# Patient Record
Sex: FEMALE | Employment: FULL TIME | URBAN - METROPOLITAN AREA
[De-identification: names, ages, dates, MRNs, and addresses within clinical notes are randomized per-mention and may not be internally consistent; named-entity substitution may affect disease eponyms.]

---

## 2017-04-18 RX ORDER — TOPIRAMATE 25 MG/1
TABLET ORAL
Qty: 180 TAB | Refills: 0 | OUTPATIENT
Start: 2017-04-18

## 2017-04-18 NOTE — TELEPHONE ENCOUNTER
Called and inquired about patients prescription and she states we had been given her the medication and she just ran out today taking Topamax 25 mg 3 this morning. Patient states there has been times when she did not take them in the morning because she forget and it have been a lot of those days. Patient states she has had refills on other bottles that she had and she used them and the bottle she has now she used the last refill. Patient was last seen 7/26/16 and was given Topamax 25 mg #180 take 3 po bid with 2 refills. I offered patient a few appointment slots for tomorrow since we had cancellations for time 830, 925, 155 and 350. Patient states she will call the office back to verify if she could do the 925 am tomorrow 4/19/17. Please make appointment if she calls back. Thank you.

## 2017-04-18 NOTE — TELEPHONE ENCOUNTER
The patient requests a courtesy fill to get her though the upcoming appointment. Please advise. Last Visit: 07/26/2016 with MD Jeni Tucker    Next Appointment: 05/10/2017 with MD Jeni Tucker;  Pt canceled 09/27/2016  Previous Refill Encounters: 06/21/2016 per MD Jeni Tucker #[id number] with 1 refill     Requested Prescriptions     Pending Prescriptions Disp Refills    topiramate (TOPAMAX) 25 mg tablet 180 Tab 0     Sig: 3 tabs PO BID

## 2017-04-19 ENCOUNTER — OFFICE VISIT (OUTPATIENT)
Dept: ORTHOPEDIC SURGERY | Age: 32
End: 2017-04-19

## 2017-04-19 VITALS
HEART RATE: 96 BPM | SYSTOLIC BLOOD PRESSURE: 138 MMHG | BODY MASS INDEX: 33.49 KG/M2 | DIASTOLIC BLOOD PRESSURE: 88 MMHG | HEIGHT: 68 IN | WEIGHT: 221 LBS

## 2017-04-19 DIAGNOSIS — M51.36 OTHER INTERVERTEBRAL DISC DEGENERATION, LUMBAR REGION: ICD-10-CM

## 2017-04-19 DIAGNOSIS — M54.16 RADICULOPATHY, LUMBAR REGION: Primary | ICD-10-CM

## 2017-04-19 DIAGNOSIS — M51.26 OTHER INTERVERTEBRAL DISC DISPLACEMENT, LUMBAR REGION: ICD-10-CM

## 2017-04-19 RX ORDER — TOPIRAMATE 25 MG/1
TABLET ORAL
Qty: 180 TAB | Refills: 1 | Status: SHIPPED | OUTPATIENT
Start: 2017-04-19 | End: 2017-08-04 | Stop reason: SDUPTHER

## 2017-04-19 NOTE — PROGRESS NOTES
[facility]  [address]  Phone: [phone]  Fax: [phone]        PROGRESS NOTE      HISTORY OF PRESENT ILLNESS:  The patient is a 32 y.o. female and was seen today for follow up of low back and right hip. She states pain does not extend to RLE posteriorly to ankle as frequently and has decreased in intensity unless she is sitting down for prolonged periods. Pt notes increased activity exacerbates her pain. She had complaints of numbness in the bottom of the foot into digits 3-5. She states twisting exacerbates pain. She tolerates Topamax 25 mg 3 po qhs with significant relief. She is a non-smoker. Note from [place], [place] 4/22/16 indicates patient was seen for acute onset of right-sided low back pain that started in the beginning of April. According to the note, over the next week pain extended into the right lower extremity and she was treated with Flexeril, oral steroids as well as NSAIDs. She states Flexeril has provided a little relief. Per note, the patient had similar episode 10 years ago and had relief with injections by Dr. Daly Hale. Pt completed physical therapy with minimal benefit. Lumbar spine XR from 4/22/16 was reviewed. Per report, there were degenerative changes and scoliosis. No acute abnormality. Mild narrowing at L4-L5. She denies previous lumbar surgery. She has not attended physical therapy/chiropractor recently. Patient denies change in bowel or bladder habits. She denies possibility of pregnancy. Lumbar spine MRI report and films dated 06/06/16 were reviewed. Per report, there was a right paracentral and inferior disc protrusion at L5-S1 which does appear to likely to affect the right S1 root. Clinical correlation is recommended as to whether or not the patient's symptoms include a right S1 radiculopathy. New broad left paracentral small disc protrusion at L2-L3.  This does not affect the left L2 or L3 root or cause significant central stenosis. Degenerative changes at L4-L5 with bulging disc. Bilateral lateral recess stenosis could possibly but does not definitively affect the proximal L5 roots. At her last clinical appointment, patient wishes to continue her current treatment as her pain was tolerable. Patient was not interested in lumbar blocks at that time. She wished to continue with Topamax 75 mg BID and I advised she continue medication with consistency. I refilled her medication. Patient should continue with remaining sessions of physical therapy. I last saw patient on 7/26/16 and was scheduled to f/u in 2 month's time but failed to do so until today. The patient returns today with pain location and distribution remain unchanged. Pt also c/o left foot paraesthesias which could be due to Topamax. She rates pain 2-6/10, an increase since her last visit (3/10). Her pain is exacerbated with activity. Pt continues with Topamax 75 mg BID which offers good relief. She frequently forgets to take her AM dose of Topamax. Pt admits inconsistency with her HEP. She denies h/o DM.  reviewed. Past Medical History:   Diagnosis Date    Hypertension         Social History     Social History    Marital status:      Spouse name: N/A    Number of children: N/A    Years of education: N/A     Occupational History    Not on file. Social History Main Topics    Smoking status: Never Smoker    Smokeless tobacco: Never Used    Alcohol use No    Drug use: No    Sexual activity: Yes     Partners: Male     Other Topics Concern    Not on file     Social History Narrative       Current Outpatient Prescriptions   Medication Sig Dispense Refill    topiramate (TOPAMAX) 25 mg tablet 3 tabs PO  Tab 1    topiramate (TOPAMAX) 25 mg tablet 3 tabs PO  Tab 1    ibuprofen (MOTRIN IB) 200 mg tablet Take 400-600 mg by mouth every six (6) hours as needed.       guaiFENesin ER (MUCINEX) 600 mg ER tablet Take 1 Tab by mouth two (2) times a day. Indications: COLD SYMPTOMS, COUGH 28 Tab 0       No Known Allergies       PHYSICAL EXAMINATION    Visit Vitals    /88    Pulse 96    Ht 5' 8\" (1.727 m)    Wt 221 lb (100.2 kg)    BMI 33.6 kg/m2       CONSTITUTIONAL: NAD, A&O x 3  SENSATION: Intact to light touch throughout  RANGE OF MOTION: The patient has full passive range of motion in all four extremities. MOTOR:  Straight Leg Raise: Negative, bilateral               Hip Flex Knee Ext Knee Flex Ankle DF GTE Ankle PF Tone   Right +4/5 +4/5 +4/5 +4/5 +4/5 +4/5 +4/5   Left +4/5 +4/5 +4/5 +4/5 +4/5 +4/5 +4/5       ASSESSMENT   Braxton Peck was seen today for follow-up. Diagnoses and all orders for this visit:    Radiculopathy, lumbar region    Other intervertebral disc degeneration, lumbar region    Other intervertebral disc displacement, lumbar region    Other orders  -     topiramate (TOPAMAX) 25 mg tablet; 3 tabs PO BID          IMPRESSION AND PLAN:  Patient reveals she is only taking Topamax 75 mg qhs and rarely her AM dose. I explained to patient how Topamax works. She does have intermittent flare ups and would like to start being consistent with her AM dose of Topamax. I will refill her Topamax 75 mg BID. I advise she closely monitor if her left foot paraesthesias increases as she begins to be consistent with her AM dose of Topamax. I recommend she increase the frequency of HEP to daily. I will see the patient back in 1 month's time or earlier if needed. Written by Srinivasan Yuong, as dictated by Stephanie Santiago MD  I examined the patient, reviewed and agree with the note.

## 2017-04-19 NOTE — MR AVS SNAPSHOT
Visit Information Date & Time Provider Department Dept. Phone Encounter #  
 4/19/2017  9:25 AM Jigna Dorsey MD [facility] [place] [phone] [id number] Follow-up Instructions Return in about 4 weeks (around 5/17/2017). Upcoming Health Maintenance Date Due DTaP/Tdap/Td series (1 - Tdap) 8/5/2006 INFLUENZA AGE 9 TO ADULT 8/1/2016 PAP AKA CERVICAL CYTOLOGY 5/23/2017 Allergies as of 4/19/2017  Review Complete On: 4/19/2017 By: Jigna Dorsey MD  
 No Known Allergies Current Immunizations  Reviewed on 11/16/2015 Name Date Influenza Vaccine (Quad) PF 11/16/2015 11:47 AM  
  
 Not reviewed this visit You Were Diagnosed With   
  
 Codes Comments Radiculopathy, lumbar region    -  Primary ICD-10-CM: M54.16 
ICD-9-CM: 724.4 Other intervertebral disc degeneration, lumbar region     ICD-10-CM: M51.36 
ICD-9-CM: 722.52 Other intervertebral disc displacement, lumbar region     ICD-10-CM: M51.26 
ICD-9-CM: 722.10 Vitals BP Pulse Height(growth percentile) Weight(growth percentile) BMI OB Status 138/88 96 5' 8\" (1.727 m) 221 lb (100.2 kg) 33.6 kg/m2 Having regular periods Smoking Status Never Smoker Vitals History BMI and BSA Data Body Mass Index Body Surface Area  
 33.6 kg/m 2 2.19 m 2 Preferred Pharmacy Pharmacy Name Phone [facility] [address] [facility], [address] [place] [address] [phone] Your Updated Medication List  
  
   
This list is accurate as of: 4/19/17 10:13 AM.  Always use your most recent med list.  
  
  
  
  
 guaiFENesin  mg ER tablet Commonly known as:  [place] 598 Take 1 Tab by mouth two (2) times a day. Indications: COLD SYMPTOMS, COUGH MOTRIN  mg tablet Generic drug:  ibuprofen Take 400-600 mg by mouth every six (6) hours as needed. * topiramate 25 mg tablet Commonly known as:  TOPAMAX 3 tabs PO BID * topiramate 25 mg tablet Commonly known as:  TOPAMAX 3 tabs PO BID * Notice: This list has 2 medication(s) that are the same as other medications prescribed for you. Read the directions carefully, and ask your doctor or other care provider to review them with you. Prescriptions Sent to Pharmacy Refills  
 topiramate (TOPAMAX) 25 mg tablet 1 Sig: 3 tabs PO BID Class: Normal  
 Pharmacy: [facility] [address], [address] [place] [address] #: [phone] Follow-up Instructions Return in about 4 weeks (around 5/17/2017). Introducing [facility] & HEALTH SERVICES! K[employer]lin R[employer] introduces 2NGageU patient portal. Now you can access parts of your medical record, email your doctor's office, and request medication refills online. 1. In your internet browser, go to [email or web address] 2. Click on the First Time User? Click Here link in the Sign In box. You will see the New Member Sign Up page. 3. Enter your 2NGageU Access Code exactly as it appears below. You will not need to use this code after youve completed the sign-up process. If you do not sign up before the expiration date, you must request a new code. · 2NGage[employer] Access Code: [email or web address] Expires: 7/17/2017  3:22 PM 
 
4. Enter the last four digits of your Social Security Number (xxxx) and Date of Birth (mm/dd/yyyy) as indicated and click Submit. You will be taken to the next sign-up page. 5. Create a 2NGageU ID. This will be your 2NGageU login ID and cannot be changed, so think of one that is secure and easy to remember. 6. Create a 2NGageU password. You can change your password at any time. 7. Enter your Password Reset Question and Answer. This can be used at a later time if you forget your password. 8. Enter your e-mail address. You will receive e-mail notification when new information is available in [address] 9. Click Sign Up. You can now view and download portions of your medical record. 10. Click the Download Summary menu link to download a portable copy of your medical information. If you have questions, please visit the Frequently Asked Questions section of the [employer] website. Remember, [employer] is NOT to be used for urgent needs. For medical emergencies, dial 911. Now available from your iPhone and Android! Please provide this summary of care documentation to your next provider. Your primary care clinician is listed as Erin Kahn. Russ Bernal. If you have any questions after today's visit, please call [phone].

## 2017-05-22 ENCOUNTER — OFFICE VISIT (OUTPATIENT)
Dept: ORTHOPEDIC SURGERY | Age: 32
End: 2017-05-22

## 2017-05-22 VITALS
BODY MASS INDEX: 33.65 KG/M2 | HEIGHT: 68 IN | SYSTOLIC BLOOD PRESSURE: 132 MMHG | DIASTOLIC BLOOD PRESSURE: 85 MMHG | HEART RATE: 85 BPM | WEIGHT: 222 LBS | RESPIRATION RATE: 16 BRPM

## 2017-05-22 DIAGNOSIS — M51.26 OTHER INTERVERTEBRAL DISC DISPLACEMENT, LUMBAR REGION: ICD-10-CM

## 2017-05-22 DIAGNOSIS — M51.36 OTHER INTERVERTEBRAL DISC DEGENERATION, LUMBAR REGION: ICD-10-CM

## 2017-05-22 DIAGNOSIS — M54.16 RADICULOPATHY, LUMBAR REGION: Primary | ICD-10-CM

## 2017-05-22 RX ORDER — TOPIRAMATE 25 MG/1
TABLET ORAL
Qty: 180 TAB | Refills: 1 | Status: SHIPPED | OUTPATIENT
Start: 2017-05-22 | End: 2017-09-27 | Stop reason: SDUPTHER

## 2017-05-22 NOTE — MR AVS SNAPSHOT
Visit Information Date & Time Provider Department Dept. Phone Encounter #  
 5/22/2017 10:15 AM Jigna Dorsey MD [address] [facility] [address] [facility] [place] [phone] [id number] Follow-up Instructions Return in about 3 months (around 8/22/2017). Upcoming Health Maintenance Date Due DTaP/Tdap/Td series (1 - Tdap) 8/5/2006 PAP AKA CERVICAL CYTOLOGY 5/23/2017 INFLUENZA AGE 9 TO ADULT 8/1/2017 Allergies as of 5/22/2017  Review Complete On: 5/22/2017 By: Jigna Dorsey MD  
 No Known Allergies Current Immunizations  Reviewed on 11/16/2015 Name Date Influenza Vaccine (Quad) PF 11/16/2015 11:47 AM  
  
 Not reviewed this visit You Were Diagnosed With   
  
 Codes Comments Radiculopathy, lumbar region    -  Primary ICD-10-CM: M54.16 
ICD-9-CM: 724.4 Other intervertebral disc degeneration, lumbar region     ICD-10-CM: M51.36 
ICD-9-CM: 722.52 Other intervertebral disc displacement, lumbar region     ICD-10-CM: M51.26 
ICD-9-CM: 722.10 Vitals BP Pulse Resp Height(growth percentile) Weight(growth percentile) BMI  
 132/85 (BP 1 Location: Right arm, BP Patient Position: Sitting) 85 16 5' 8\" (1.727 m) 222 lb (100.7 kg) 33.75 kg/m2 OB Status Smoking Status Having regular periods Never Smoker Vitals History BMI and BSA Data Body Mass Index Body Surface Area 33.75 kg/m 2 2.2 m 2 Preferred Pharmacy Pharmacy Name Phone Sudha Li [address] [facility] [address], [address] [place] [address] Road [phone] Your Updated Medication List  
  
   
This list is accurate as of: 5/22/17 10:24 AM.  Always use your most recent med list.  
  
  
  
  
 MOTRIN  mg tablet Generic drug:  ibuprofen Take 400-600 mg by mouth every six (6) hours as needed. * topiramate 25 mg tablet Commonly known as:  TOPAMAX 3 tabs PO BID * topiramate 25 mg tablet Commonly known as:  TOPAMAX 3 tabs PO BID * topiramate 25 mg tablet Commonly known as:  TOPAMAX 3 tabs PO BID * Notice: This list has 3 medication(s) that are the same as other medications prescribed for you. Read the directions carefully, and ask your doctor or other care provider to review them with you. Prescriptions Sent to Pharmacy Refills  
 topiramate (TOPAMAX) 25 mg tablet 1 Sig: 3 tabs PO BID Class: Normal  
 Pharmacy: [facility] [address], [address] [place] [address] #: [phone] Follow-up Instructions Return in about 3 months (around 8/22/2017). Introducing hospitals & HEALTH SERVICES! [place] [employer] introduces [employer]o patient portal. Now you can access parts of your medical record, email your doctor's office, and request medication refills online. 1. In your internet browser, go to [email or web address] 2. Click on the First Time User? Click Here link in the Sign In box. You will see the New Member Sign Up page. 3. Enter your Fliplingo Access Code exactly as it appears below. You will not need to use this code after youve completed the sign-up process. If you do not sign up before the expiration date, you must request a new code. · Fliplingo Access Code: [email or web address] Expires: 7/17/2017  3:22 PM 
 
4. Enter the last four digits of your Social Security Number (xxxx) and Date of Birth (mm/dd/yyyy) as indicated and click Submit. You will be taken to the next sign-up page. 5. Create a PromoJamt ID. This will be your PromoJamt login ID and cannot be changed, so think of one that is secure and easy to remember. 6. Create a Fliplingo password. You can change your password at any time. 7. Enter your Password Reset Question and Answer. This can be used at a later time if you forget your password. 8. Enter your e-mail address. You will receive e-mail notification when new information is available in [address] 9. Click Sign Up. You can now view and download portions of your medical record. 10. Click the Download Summary menu link to download a portable copy of your medical information. If you have questions, please visit the Frequently Asked Questions section of the [employer] website. Remember, [employer] is NOT to be used for urgent needs. For medical emergencies, dial 911. Now available from your iPhone and Android! Please provide this summary of care documentation to your next provider. Your primary care clinician is listed as Darek [place]. If you have any questions after today's visit, please call [phone].

## 2017-05-22 NOTE — PROGRESS NOTES
[facility]  [address]  Phone: [phone]  Fax: [phone]        PROGRESS NOTE      HISTORY OF PRESENT ILLNESS:  The patient is a 32 y.o. female and was seen today for follow up of low back and right hip. She states pain does not extend to RLE posteriorly to ankle as frequently and has decreased in intensity unless she is sitting down for prolonged periods. Pt notes increased activity or twisting exacerbate her pain. She had complaints of numbness in the bottom of the foot into digits 3-5. Note from [place], [place] 4/22/16 indicates patient was seen for acute onset of right-sided low back pain that started in the beginning of April. According to the note, over the next week pain extended into the right lower extremity and she was treated with Flexeril, oral steroids as well as NSAIDs. She states Flexeril has provided a little relief. Per note, the patient had similar episode 10 years ago and had relief with injections by Dr. Serina Arellano. Pt continues with Topamax 75 mg BID which offers good relief. She frequently forgets to take her AM dose of Topamax. Pt completed physical therapy with minimal benefit. Lumbar spine XR from 4/22/16 was reviewed. Per report, there were degenerative changes and scoliosis. No acute abnormality. Mild narrowing at L4-L5. She denies previous lumbar surgery. She has not attended physical therapy/chiropractor recently. Pt denies change in bowel or bladder habits. She denies possibility of pregnancy. Pt denies h/o DM. Lumbar spine MRI report and films dated 06/06/16 were reviewed. Per report, there was a right paracentral and inferior disc protrusion at L5-S1 which does appear to likely to affect the right S1 root. Clinical correlation is recommended as to whether or not the patient's symptoms include a right S1 radiculopathy. New broad left paracentral small disc protrusion at L2-L3.  This does not affect the left L2 or L3 root or cause significant central stenosis. Degenerative changes at L4-L5 with bulging disc. Bilateral lateral recess stenosis could possibly but does not definitively affect the proximal L5 roots. At her last clinical appointment, patient revealed she was only taking Topamax 75 mg qhs and rarely her AM dose. I explained to patient how Topamax works. She did have intermittent flare ups and wished to start being consistent with her AM dose of Topamax. I refilled her Topamax 75 mg BID. I advised she closely monitor if her left foot paraesthesias increased as she began to be consistent with her AM dose of Topamax. I recommended she increase the frequency of HEP to daily. The patient returns today with pain location and distribution remain unchanged. She rates pain 2/10, an improvement since her last visit (2-6/10). Pt is currently taking Topamax 75 mg BID with benefit. She states she continues to have left foot paraesthesias. Pt admits completing her HEP 5x/week.  reviewed. Past Medical History:   Diagnosis Date    Hypertension         Social History     Social History    Marital status:      Spouse name: N/A    Number of children: N/A    Years of education: N/A     Occupational History    Not on file. Social History Main Topics    Smoking status: Never Smoker    Smokeless tobacco: Never Used    Alcohol use No    Drug use: No    Sexual activity: Yes     Partners: Male     Other Topics Concern    Not on file     Social History Narrative       Current Outpatient Prescriptions   Medication Sig Dispense Refill    topiramate (TOPAMAX) 25 mg tablet 3 tabs PO  Tab 1    topiramate (TOPAMAX) 25 mg tablet 3 tabs PO  Tab 1    topiramate (TOPAMAX) 25 mg tablet 3 tabs PO  Tab 1    ibuprofen (MOTRIN IB) 200 mg tablet Take 400-600 mg by mouth every six (6) hours as needed.          No Known Allergies       PHYSICAL EXAMINATION    Visit Vitals    /85 (BP 1 Location: Right arm, BP Patient Position: Sitting)    Pulse 85    Resp 16    Ht 5' 8\" (1.727 m)    Wt 222 lb (100.7 kg)    BMI 33.75 kg/m2       CONSTITUTIONAL: NAD, A&O x 3  SENSATION: Intact to light touch throughout  RANGE OF MOTION: The patient has full passive range of motion in all four extremities. MOTOR:  Straight Leg Raise: Negative, bilateral               Hip Flex Knee Ext Knee Flex Ankle DF GTE Ankle PF Tone   Right +4/5 +4/5 +4/5 +4/5 +4/5 +4/5 +4/5   Left +4/5 +4/5 +4/5 +4/5 +4/5 +4/5 +4/5       ASSESSMENT   Lalita Carvajal was seen today for back pain and hip pain. Diagnoses and all orders for this visit:    Radiculopathy, lumbar region    Other intervertebral disc degeneration, lumbar region    Other intervertebral disc displacement, lumbar region    Other orders  -     topiramate (TOPAMAX) 25 mg tablet; 3 tabs PO BID          IMPRESSION AND PLAN:  Patient is not particularly interested in surgical intervention or lumbar blocks at this time. She reports her symptoms are well-controlled on Topamax 75 mg BID. Patient wished to continue her current treatment. She was provided with refills of her Topamax 75 mg BID. I recommend she increase the frequency of HEP to daily. I will see the patient back in 3 month's time or earlier if needed. Written by Arthea , as dictated by Waqas Reynoso MD  I examined the patient, reviewed and agree with the note.

## 2017-06-23 ENCOUNTER — TELEPHONE (OUTPATIENT)
Dept: ORTHOPEDIC SURGERY | Age: 32
End: 2017-06-23

## 2017-06-23 DIAGNOSIS — M54.16 RADICULOPATHY, LUMBAR REGION: ICD-10-CM

## 2017-06-23 DIAGNOSIS — M51.26 OTHER INTERVERTEBRAL DISC DISPLACEMENT, LUMBAR REGION: Primary | ICD-10-CM

## 2017-06-23 DIAGNOSIS — M51.36 DDD (DEGENERATIVE DISC DISEASE), LUMBAR: ICD-10-CM

## 2017-06-23 DIAGNOSIS — M54.16 LUMBAR NEURITIS: ICD-10-CM

## 2017-06-23 RX ORDER — METHYLPREDNISOLONE 4 MG/1
TABLET ORAL
Qty: 1 DOSE PACK | Refills: 0 | Status: SHIPPED | OUTPATIENT
Start: 2017-06-23 | End: 2017-08-04 | Stop reason: ALTCHOICE

## 2017-06-23 NOTE — TELEPHONE ENCOUNTER
Called and left voice mail informing patient that the provider will be doing a prescription for Medrol dose pack and take it as directed.

## 2017-06-23 NOTE — TELEPHONE ENCOUNTER
Is she neurologically intact? If so, we could try a MDP (if not diabetic) and hopefully that will calm things down and schedule for an earlier f/u appointment.

## 2017-06-23 NOTE — TELEPHONE ENCOUNTER
Pt called in states that last Tuesday 06/20/17 the pt was lifting her daughter (toddler) off the toilet and she felt a grinding on her pelvic and spine area. Pt states that last time after a few days it got better with ibuprofen but its not working this time. Pt states that she is in pain with a burning sensation on her lower back and sharp pains that go to her hip and pelvic area. Pt is still working but very uncomfortable in every position unless she is laying on her side. Pt would like to know if she should be seen or go to an urgent care.      Please advise pt at [phone]

## 2017-06-27 NOTE — TELEPHONE ENCOUNTER
Called and left a voice mail inquiring about how she was doing since taking the Medrol Dose pack or if she ever picked it up. Patient does not have a follow up at this time.

## 2017-06-29 ENCOUNTER — TELEPHONE (OUTPATIENT)
Dept: ORTHOPEDIC SURGERY | Age: 32
End: 2017-06-29

## 2017-06-29 NOTE — TELEPHONE ENCOUNTER
Patient last seen 5/22/17 and she was provided with refills of her Topamax 25 mg # po bid and scheduled to follow up in 3 months. Patient then called on 6/23/17 due to having some increased back pain after picking her child up and was given a Medrol dose pack. Patient has no follow up. Please advise.

## 2017-06-29 NOTE — TELEPHONE ENCOUNTER
Patient called to report that methylPREDNISolone (MEDROL DOSEPACK) 4 mg tablet is now completed and her pain is worsening and her sciatica is also worse.   Please contact patient to discuss at either [phone] or [phone]

## 2017-06-30 ENCOUNTER — OFFICE VISIT (OUTPATIENT)
Dept: ORTHOPEDIC SURGERY | Age: 32
End: 2017-06-30

## 2017-06-30 VITALS
DIASTOLIC BLOOD PRESSURE: 82 MMHG | WEIGHT: 219 LBS | HEIGHT: 68 IN | SYSTOLIC BLOOD PRESSURE: 134 MMHG | HEART RATE: 82 BPM | BODY MASS INDEX: 33.19 KG/M2

## 2017-06-30 DIAGNOSIS — M51.36 OTHER INTERVERTEBRAL DISC DEGENERATION, LUMBAR REGION: ICD-10-CM

## 2017-06-30 DIAGNOSIS — M54.16 RADICULOPATHY, LUMBAR REGION: Primary | ICD-10-CM

## 2017-06-30 DIAGNOSIS — M51.26 OTHER INTERVERTEBRAL DISC DISPLACEMENT, LUMBAR REGION: ICD-10-CM

## 2017-06-30 RX ORDER — NAPROXEN 500 MG/1
500 TABLET ORAL 2 TIMES DAILY WITH MEALS
Qty: 30 TAB | Refills: 0 | Status: SHIPPED | OUTPATIENT
Start: 2017-06-30 | End: 2018-05-23 | Stop reason: ALTCHOICE

## 2017-06-30 RX ORDER — OXYCODONE AND ACETAMINOPHEN 5; 325 MG/1; MG/1
1 TABLET ORAL 3 TIMES DAILY
Qty: 60 TAB | Refills: 0 | Status: SHIPPED | OUTPATIENT
Start: 2017-06-30 | End: 2017-08-04 | Stop reason: SDUPTHER

## 2017-06-30 RX ORDER — METHYLPREDNISOLONE 4 MG/1
TABLET ORAL
Qty: 1 DOSE PACK | Refills: 0 | Status: SHIPPED | OUTPATIENT
Start: 2017-06-30 | End: 2017-08-04 | Stop reason: ALTCHOICE

## 2017-06-30 NOTE — MR AVS SNAPSHOT
Visit Information Date & Time Provider Department Dept. Phone Encounter #  
 6/30/2017  9:15 AM Vivi Patino MD 4 [facility], [address] [facility] [place] [phone] [id number] Follow-up Instructions Return for following MRI. Upcoming Health Maintenance Date Due DTaP/Tdap/Td series (1 - Tdap) 8/5/2006 PAP AKA CERVICAL CYTOLOGY 5/23/2017 INFLUENZA AGE 9 TO ADULT 8/1/2017 Allergies as of 6/30/2017  Review Complete On: 6/30/2017 By: Vivi Patino MD  
 No Known Allergies Current Immunizations  Reviewed on 11/16/2015 Name Date Influenza Vaccine (Quad) PF 11/16/2015 11:47 AM  
  
 Not reviewed this visit You Were Diagnosed With   
  
 Codes Comments Radiculopathy, lumbar region    -  Primary ICD-10-CM: M54.16 
ICD-9-CM: 724.4 Other intervertebral disc degeneration, lumbar region     ICD-10-CM: M51.36 
ICD-9-CM: 722.52 Other intervertebral disc displacement, lumbar region     ICD-10-CM: M51.26 
ICD-9-CM: 722.10 Vitals BP Pulse Height(growth percentile) Weight(growth percentile) BMI OB Status 134/82 (BP 1 Location: Left arm, BP Patient Position: Sitting) 82 5' 8\" (1.727 m) 219 lb (99.3 kg) 33.3 kg/m2 Having regular periods Smoking Status Never Smoker Vitals History BMI and BSA Data Body Mass Index Body Surface Area  
 33.3 kg/m 2 2.18 m 2 Preferred Pharmacy Pharmacy Name Phone [facility] [address] [phone] Your Updated Medication List  
  
   
This list is accurate as of: 6/30/17 10:05 AM.  Always use your most recent med list.  
  
  
  
  
 * methylPREDNISolone 4 mg tablet Commonly known as:  Evorn Katayama Per dose pack instructions * methylPREDNISolone 4 mg tablet Commonly known as:  Evorn Katayama Per dose pack instructions MOTRIN  mg tablet Generic drug:  ibuprofen Take 400-600 mg by mouth every six (6) hours as needed. naproxen 500 mg tablet Commonly known as:  NAPROSYN Take 1 Tab by mouth two (2) times daily (with meals). oxyCODONE-acetaminophen 5-325 mg per tablet Commonly known as:  PERCOCET Take 1 Tab by mouth three (3) times daily. Max Daily Amount: 3 Tabs. * topiramate 25 mg tablet Commonly known as:  TOPAMAX 3 tabs PO BID * topiramate 25 mg tablet Commonly known as:  TOPAMAX 3 tabs PO BID * topiramate 25 mg tablet Commonly known as:  TOPAMAX 3 tabs PO BID * Notice: This list has 5 medication(s) that are the same as other medications prescribed for you. Read the directions carefully, and ask your doctor or other care provider to review them with you. Prescriptions Printed Refills  
 oxyCODONE-acetaminophen (PERCOCET) 5-325 mg per tablet 0 Sig: Take 1 Tab by mouth three (3) times daily. Max Daily Amount: 3 Tabs. Class: Print Route: Oral  
  
Prescriptions Sent to Pharmacy Refills  
 methylPREDNISolone (MEDROL DOSEPACK) 4 mg tablet 0 Sig: Per dose pack instructions Class: Normal  
 Pharmacy: [facility] Ph #: [phone]  
 naproxen (NAPROSYN) 500 mg tablet 0 Sig: Take 1 Tab by mouth two (2) times daily (with meals). Class: Normal  
 Pharmacy: [facility] [address] Ph #: [phone] Route: Oral  
  
Follow-up Instructions Return for following MRI. To-Do List   
 07/07/2017 Imaging:  MRI LUMB SPINE WO CONT Referral Information Referral ID Referred By Referred To  
  
 [id number] CARLOS ALBERTO PARRA III Not Available Visits Status Start Date End Date 1 New Request 6/30/17 6/30/18 If your referral has a status of pending review or denied, additional information will be sent to support the outcome of this decision. Introducing [facility]! Kaitlin Rdz introduces [facility] patient portal. Now you can access parts of your medical record, email your doctor's office, and request medication refills online. 1. In your internet browser, go to [email or web address] 2. Click on the First Time User? Click Here link in the Sign In box. You will see the New Member Sign Up page. 3. Enter your [facility] Access Code exactly as it appears below. You will not need to use this code after youve completed the sign-up process. If you do not sign up before the expiration date, you must request a new code. · [facility] Access Code: [email or web address] Expires: 7/17/2017  3:22 PM 
 
4. Enter the last four digits of your Social Security Number (xxxx) and Date of Birth (mm/dd/yyyy) as indicated and click Submit. You will be taken to the next sign-up page. 5. Create a [facility] ID. This will be your [facility] login ID and cannot be changed, so think of one that is secure and easy to remember. 6. Create a [facility] password. You can change your password at any time. 7. Enter your Password Reset Question and Answer. This can be used at a later time if you forget your password. 8. Enter your e-mail address. You will receive e-mail notification when new information is available in [address] 9. Click Sign Up. You can now view and download portions of your medical record. 10. Click the Download Summary menu link to download a portable copy of your medical information. If you have questions, please visit the Frequently Asked Questions section of the [facility] website. Remember, [facility] is NOT to be used for urgent needs. For medical emergencies, dial 911. Now available from your iPhone and Android! Please provide this summary of care documentation to your next provider. Your primary care clinician is listed as Leigha Pichardo. Horatio Gitelman. If you have any questions after today's visit, please call [phone].

## 2017-06-30 NOTE — PROGRESS NOTES
[facility]  [address], [place]   Phone: [phone]  Fax: [phone]        PROGRESS NOTE      HISTORY OF PRESENT ILLNESS:  The patient is a 32 y.o. female and was seen today for follow up of low back and right hip. She states pain does not extend to RLE posteriorly to ankle as frequently and has decreased in intensity unless she is sitting down for prolonged periods. Pt notes increased activity or twisting exacerbate her pain. She states she continues to have left foot paraesthesias. Note from [place], [place] 4/22/16 indicates patient was seen for acute onset of right-sided low back pain that started in the beginning of April. According to the note, over the next week pain extended into the right lower extremity and she was treated with Flexeril, oral steroids as well as NSAIDs. She states Flexeril has provided a little relief. Per note, the patient had similar episode 10 years ago and had relief with injections by Dr. Marino Raymundo. Pt continues with Topamax 75 mg BID. She frequently forgets to take her AM dose of Topamax. Pt completed physical therapy with minimal benefit. Pt denies h/o DM, stomach ulcers or bleeding disorders. Lumbar spine XR from 4/22/16 was reviewed. Per report, there were degenerative changes and scoliosis. No acute abnormality. Mild narrowing at L4-L5. She denies previous lumbar surgery. She has not attended physical therapy/chiropractor recently. Pt denies change in bowel or bladder habits. She denies possibility of pregnancy. Pt denies h/o DM. Lumbar spine MRI report and films dated 6/6/16 were reviewed. Per report, there was a right paracentral and inferior disc protrusion at L5-S1 which does appear to likely to affect the right S1 root. Clinical correlation is recommended as to whether or not the patient's symptoms include a right S1 radiculopathy. New broad left paracentral small disc protrusion at L2-L3.  This does not affect the left L2 or L3 root or cause significant central stenosis. Degenerative changes at L4-L5 with bulging disc. Bilateral lateral recess stenosis could possibly but does not definitively affect the proximal L5 roots. At her last clinical appointment, patient was not particularly interested in surgical intervention or lumbar blocks at that time. She reported her symptoms were well-controlled on Topamax 75 mg BID. Patient wished to continue her current treatment. She was provided with refills of her Topamax 75 mg BID. I recommended she increase the frequency of HEP to daily. I last saw patient on 5/22/17 and was to f/u in 3 month's time but is seen today earlier than scheduled secondary to onset of left-sided low back and hip pain extending into the LLE in an L5 distribution to the big toe since 6/20/17, no recent injury. Pt denies right-sided symptoms at this time. She rates pain 10/10, elevated since her last visit (2/10). Pt was given a Medrol Dosepak on 6/23/17 which provided no relief. She continues with Topamax 75 mg BID. Pt admits completing her HEP 5x/week.  reviewed. Past Medical History:   Diagnosis Date    Hypertension         Social History     Social History    Marital status:      Spouse name: N/A    Number of children: N/A    Years of education: N/A     Occupational History    Not on file. Social History Main Topics    Smoking status: Never Smoker    Smokeless tobacco: Never Used    Alcohol use No    Drug use: No    Sexual activity: Yes     Partners: Male     Other Topics Concern    Not on file     Social History Narrative       Current Outpatient Prescriptions   Medication Sig Dispense Refill    methylPREDNISolone (MEDROL DOSEPACK) 4 mg tablet Per dose pack instructions 1 Dose Pack 0    naproxen (NAPROSYN) 500 mg tablet Take 1 Tab by mouth two (2) times daily (with meals).  30 Tab 0    oxyCODONE-acetaminophen (PERCOCET) 5-325 mg per tablet Take 1 Tab by mouth three (3) times daily. Max Daily Amount: 3 Tabs. 60 Tab 0    topiramate (TOPAMAX) 25 mg tablet 3 tabs PO  Tab 1    topiramate (TOPAMAX) 25 mg tablet 3 tabs PO  Tab 1    topiramate (TOPAMAX) 25 mg tablet 3 tabs PO  Tab 1    ibuprofen (MOTRIN IB) 200 mg tablet Take 400-600 mg by mouth every six (6) hours as needed.  methylPREDNISolone (MEDROL DOSEPACK) 4 mg tablet Per dose pack instructions 1 Dose Pack 0       No Known Allergies       PHYSICAL EXAMINATION    Visit Vitals    /82 (BP 1 Location: Left arm, BP Patient Position: Sitting)    Pulse 82    Ht 5' 8\" (1.727 m)    Wt 219 lb (99.3 kg)    BMI 33.3 kg/m2       CONSTITUTIONAL: NAD, A&O x 3  SENSATION: Intact to light touch throughout  RANGE OF MOTION: The patient has full passive range of motion in all four extremities. MOTOR:  Straight Leg Raise: Positive, left               Hip Flex Knee Ext Knee Flex Ankle DF GTE Ankle PF Tone   Right +4/5 +4/5 +4/5 +4/5 +4/5 +4/5 +4/5   Left +4/5 +4/5 +4/5 +4/5 +4/5 +4/5 +4/5       ASSESSMENT   Paula Galarza was seen today for back pain. Diagnoses and all orders for this visit:    Radiculopathy, lumbar region  -     MRI LUMB SPINE WO CONT; Future    Other intervertebral disc degeneration, lumbar region  -     MRI LUMB SPINE WO CONT; Future    Other intervertebral disc displacement, lumbar region  -     MRI LUMB SPINE WO CONT; Future    Other orders  -     methylPREDNISolone (MEDROL DOSEPACK) 4 mg tablet; Per dose pack instructions  -     naproxen (NAPROSYN) 500 mg tablet; Take 1 Tab by mouth two (2) times daily (with meals). -     oxyCODONE-acetaminophen (PERCOCET) 5-325 mg per tablet; Take 1 Tab by mouth three (3) times daily. Max Daily Amount: 3 Tabs. IMPRESSION AND PLAN:  The patient has left-sided symptoms with primarily right-sided pathology. I will set her up for a new lumbar spine MRI. I advised patient to bring copies of films to next visit. I will start her on another Medrol Dosepak.  I gave her a prescription for Naproxen following completion of the Medrol Dosepak. I will give her a Rx for Percocet 5-325 mg TID prn. She should continue on Topamax 75 mg BID. I will see the patient back following MRI. Written by Steve Velásquez, as dictated by Milagros Wilder MD  I examined the patient, reviewed and agree with the note.

## 2017-07-07 ENCOUNTER — DOCUMENTATION ONLY (OUTPATIENT)
Dept: ORTHOPEDIC SURGERY | Age: 32
End: 2017-07-07

## 2017-07-07 NOTE — PROGRESS NOTES
MRI Lumbar without is scheduled at [facility], [address] [facility] [address] on 07/12/17, arrive 9:30AM, test 10:00AM. Pre-authorization is [id number], effective 07/07/17-08/05/17. I have left a message for Ms. Vannesa Camarenaanjelica regarding this appointment. Her follow-up is on 08/04/17 with Dr. Red Dotson.

## 2017-07-10 ENCOUNTER — TELEPHONE (OUTPATIENT)
Dept: ORTHOPEDIC SURGERY | Age: 32
End: 2017-07-10

## 2017-07-10 NOTE — TELEPHONE ENCOUNTER
Patient was unable to completely fill the Percocet prescription. She was informed by her pharmacy her insurance will only approve a 7 day supply so she was given 21 pills. Please provide another prescription. MsAdonay Ernestina Corona will  the prescription at [facility]. Please call patient to advise. Thank you.

## 2017-07-11 RX ORDER — OXYCODONE AND ACETAMINOPHEN 5; 325 MG/1; MG/1
1 TABLET ORAL 3 TIMES DAILY
Qty: 21 TAB | Refills: 0 | Status: SHIPPED | OUTPATIENT
Start: 2017-07-14 | End: 2017-11-14 | Stop reason: SDUPTHER

## 2017-07-11 NOTE — TELEPHONE ENCOUNTER
Please let her know I can write another rx but the insurance may deny that as well. With new guidelines, they are only covering a 1 week supply for acute onset pain.

## 2017-07-11 NOTE — TELEPHONE ENCOUNTER
Pt notified NLP out of office until 7-21-17. Pt is willing to  Rx from [facility] location. Pt states she has enough medication to last until 7-14-17. Last Rx    oxyCODONE-acetaminophen (PERCOCET) 5-325 mg per tablet 60 Tab 0 6/30/2017     Sig - Route: Take 1 Tab by mouth three (3) times daily. Max Daily Amount: 3 Tabs.  - Oral    Class: Print

## 2017-07-12 NOTE — TELEPHONE ENCOUNTER
Called and left voice mail informing patient that we do have a prescription for her to  in our [facility] office.  It will be ready tomorrow 7/13/17 after 12 pm.

## 2017-07-25 DIAGNOSIS — M51.26 OTHER INTERVERTEBRAL DISC DISPLACEMENT, LUMBAR REGION: ICD-10-CM

## 2017-07-25 DIAGNOSIS — M51.36 OTHER INTERVERTEBRAL DISC DEGENERATION, LUMBAR REGION: ICD-10-CM

## 2017-07-25 DIAGNOSIS — M54.16 RADICULOPATHY, LUMBAR REGION: ICD-10-CM

## 2017-08-04 ENCOUNTER — OFFICE VISIT (OUTPATIENT)
Dept: ORTHOPEDIC SURGERY | Age: 32
End: 2017-08-04

## 2017-08-04 VITALS
HEIGHT: 68 IN | WEIGHT: 218 LBS | HEART RATE: 96 BPM | SYSTOLIC BLOOD PRESSURE: 139 MMHG | DIASTOLIC BLOOD PRESSURE: 90 MMHG | BODY MASS INDEX: 33.04 KG/M2

## 2017-08-04 DIAGNOSIS — M54.16 LUMBAR RADICULOPATHY: ICD-10-CM

## 2017-08-04 DIAGNOSIS — M51.26 LUMBAR HERNIATED DISC: Primary | ICD-10-CM

## 2017-08-04 DIAGNOSIS — M51.36 OTHER INTERVERTEBRAL DISC DEGENERATION, LUMBAR REGION: ICD-10-CM

## 2017-08-04 RX ORDER — TOPIRAMATE 25 MG/1
TABLET ORAL
Qty: 180 TAB | Refills: 2 | Status: ON HOLD | OUTPATIENT
Start: 2017-08-04 | End: 2017-09-07

## 2017-08-04 RX ORDER — OXYCODONE AND ACETAMINOPHEN 5; 325 MG/1; MG/1
1 TABLET ORAL 3 TIMES DAILY
Qty: 21 TAB | Refills: 0 | Status: SHIPPED | OUTPATIENT
Start: 2017-08-04 | End: 2018-05-23 | Stop reason: ALTCHOICE

## 2017-08-04 NOTE — PROGRESS NOTES
[facility]  [address]   Phone: [phone]  Fax: [phone]        PROGRESS NOTE      HISTORY OF PRESENT ILLNESS:  The patient is a 32 y.o. female and was seen today for follow up of left-sided low back and hip pain extending intermittently into the LLE in an L5 distribution to the big toe since 6/20/17, no recent injury. Pt states pain does not extend to RLE posteriorly to ankle as frequently and has decreased in intensity unless she is sitting down for prolonged periods. Pt notes increased activity or twisting exacerbate her pain. She states she continues to have left foot paraesthesias. Note from [place], [place] 4/22/16 indicates patient was seen for acute onset of right-sided low back pain that started in the beginning of April. According to the note, over the next week pain extended into the right lower extremity and she was treated with Flexeril, oral steroids as well as NSAIDs. She states Flexeril has provided a little relief. Per note, the patient had similar episode 10 years ago and had relief with injections by Dr. Nancy Garcia. Pt was given a Medrol Dosepak on 6/23/17 which provided no relief. Pt continues with Topamax 75 mg BID. She frequently forgets to take her AM dose of Topamax. Pt completed physical therapy with minimal benefit. Pt denies h/o DM, stomach ulcers or bleeding disorders. She denies previous lumbar surgery. She has not attended physical therapy/chiropractor recently. Pt denies change in bowel or bladder habits. She denies possibility of pregnancy. Pt denies h/o DM. Lumbar spine XR from 4/22/16 was reviewed. Per report, there were degenerative changes and scoliosis. No acute abnormality. Mild narrowing at L4-L5. Lumbar spine MRI report and films dated 6/6/16 were reviewed. Per report, there was a right paracentral and inferior disc protrusion at L5-S1 which does appear to likely to affect the right S1 root.  Clinical correlation is recommended as to whether or not the patient's symptoms include a right S1 radiculopathy. New broad left paracentral small disc protrusion at L2-L3. This does not affect the left L2 or L3 root or cause significant central stenosis. Degenerative changes at L4-L5 with bulging disc. Bilateral lateral recess stenosis could possibly but does not definitively affect the proximal L5 roots. At her last clinical appointment, the patient had left-sided symptoms with primarily right-sided pathology. I set her up for a new lumbar spine MRI. I started her on another Medrol Dosepak. I gave her a prescription for Naproxen following completion of the Medrol Dosepak. I gave her a Rx for Percocet 5-325 mg TID prn. She should continue on Topamax 75 mg BID. The patient returns today with pain location and distribution remain unchanged. She rates pain 5-9/10, a slight decrease since her last visit (10/10). Pt reports slight relief with Medrol Dosepak. She continues with Topamax 75 mg BID. Pt admits completing her HEP 5x/week. Lumbar spine MRI dated 7/12/17 reviewed. Per report, at L2-3, small-to-moderate central disc protrusion with mild central stenosis. At L4-5, left central disc extrusion with severe left lateral recess narrowing. At L5-S1, small right central disc protrusion with subtle right lateral recess narrowing. posterior radial annular fissures at L2-3, L4-5, and L5-S1.  reviewed. Past Medical History:   Diagnosis Date    Hypertension         Social History     Social History    Marital status:      Spouse name: N/A    Number of children: N/A    Years of education: N/A     Occupational History    Not on file.      Social History Main Topics    Smoking status: Never Smoker    Smokeless tobacco: Never Used    Alcohol use No    Drug use: No    Sexual activity: Yes     Partners: Male     Other Topics Concern    Not on file     Social History Narrative       Current Outpatient Prescriptions Medication Sig Dispense Refill    topiramate (TOPAMAX) 25 mg tablet 3 tabs PO  Tab 2    oxyCODONE-acetaminophen (PERCOCET) 5-325 mg per tablet Take 1 Tab by mouth three (3) times daily. Max Daily Amount: 3 Tabs. 21 Tab 0    oxyCODONE-acetaminophen (PERCOCET) 5-325 mg per tablet Take 1 Tab by mouth three (3) times daily. Max Daily Amount: 3 Tabs. 21 Tab 0    naproxen (NAPROSYN) 500 mg tablet Take 1 Tab by mouth two (2) times daily (with meals). 30 Tab 0    topiramate (TOPAMAX) 25 mg tablet 3 tabs PO  Tab 1    ibuprofen (MOTRIN IB) 200 mg tablet Take 400-600 mg by mouth every six (6) hours as needed. No Known Allergies       PHYSICAL EXAMINATION    Visit Vitals    /90    Pulse 96    Ht 5' 8\" (1.727 m)    Wt 218 lb (98.9 kg)    BMI 33.15 kg/m2       CONSTITUTIONAL: NAD, A&O x 3  SENSATION: Decreased sensation to light touch at L5/S1 of the LLE. Sensation to light touch otherwise intact. RANGE OF MOTION: The patient has full passive range of motion in all four extremities. MOTOR:  Straight Leg Raise: Negative, bilateral               Hip Flex Knee Ext Knee Flex Ankle DF GTE Ankle PF Tone   Right +4/5 +4/5 +4/5 +4/5 +4/5 +4/5 +4/5   Left +4/5 +4/5 +4/5 +4/5 +4/5 +4/5 +4/5       ASSESSMENT   Diagnoses and all orders for this visit:    1. Lumbar herniated disc  -     SCHEDULE SURGERY    2. Lumbar radiculopathy  -     SCHEDULE SURGERY    3. Other intervertebral disc degeneration, lumbar region  -     SCHEDULE SURGERY    Other orders  -     topiramate (TOPAMAX) 25 mg tablet; 3 tabs PO BID  -     oxyCODONE-acetaminophen (PERCOCET) 5-325 mg per tablet; Take 1 Tab by mouth three (3) times daily. Max Daily Amount: 3 Tabs. IMPRESSION AND PLAN:  Patient has a left central disc extrusion with severe left lateral recess narrowing at the L4-5 level to account for her symptoms. She elects to proceed with lumbar blocks. I will order left L5 SNRB.  Patient should continue on Topamax 75 mg BID and was given refills. Also given refills of her Percocet. I recommend she increase the frequency of HEP to daily. I will see the patient back following block. Written by Parvez Matt, as dictated by Ivan Mon MD  I examined the patient, reviewed and agree with the note.

## 2017-09-06 RX ORDER — OXYCODONE AND ACETAMINOPHEN 5; 325 MG/1; MG/1
1 TABLET ORAL 3 TIMES DAILY
Qty: 21 TAB | Refills: 0 | OUTPATIENT
Start: 2017-09-06

## 2017-09-06 NOTE — TELEPHONE ENCOUNTER
Please contact the patient with refusal reason.  I'm not sure how long Dr. Eri Carmona intended for the medication to last.

## 2017-09-06 NOTE — TELEPHONE ENCOUNTER
[facility]     Last Visit: 08/04/2017 with MD Nettie Pineda   Date of Upcoming Block: 09/07/2017 left L5 SNRB  Next Appointment: 09/27/2017 with MD Nettie Pineda   Previous Refill Encounters: 08/04/2017 per MD Nettie Pineda #[id number]    Requested Prescriptions     Pending Prescriptions Disp Refills    oxyCODONE-acetaminophen (PERCOCET) 5-325 mg per tablet 21 Tab 0     Sig: Take 1 Tab by mouth three (3) times daily. Max Daily Amount: 3 Tabs.

## 2017-09-06 NOTE — TELEPHONE ENCOUNTER
Patient called requesting a refill of the prescription oxyCODONE-acetaminophen (PERCOCET) 5-325 mg per tablet. She is requesting to  the script from the [facility] office. Please advise patient at [phone].

## 2017-09-06 NOTE — TELEPHONE ENCOUNTER
Per his note, I will order left L5 SNRB. Patient should continue on Topamax 75 mg BID and was given refills. Also given refills of her Percocet. I recommend she increase the frequency of HEP to daily. I will see the patient back following block.     She is scheduled for block tomorrow

## 2017-09-07 ENCOUNTER — HOSPITAL ENCOUNTER (OUTPATIENT)
Age: 32
Setting detail: OUTPATIENT SURGERY
Discharge: HOME | End: 2017-09-07
Attending: PHYSICAL MEDICINE & REHABILITATION | Admitting: PHYSICAL MEDICINE & REHABILITATION
Payer: COMMERCIAL

## 2017-09-07 ENCOUNTER — APPOINTMENT (OUTPATIENT)
Dept: GENERAL RADIOLOGY | Age: 32
End: 2017-09-07
Attending: PHYSICAL MEDICINE & REHABILITATION
Payer: COMMERCIAL

## 2017-09-07 VITALS
RESPIRATION RATE: 16 BRPM | OXYGEN SATURATION: 96 % | SYSTOLIC BLOOD PRESSURE: 164 MMHG | BODY MASS INDEX: 33.04 KG/M2 | TEMPERATURE: 98.4 F | HEART RATE: 97 BPM | HEIGHT: 68 IN | WEIGHT: 218 LBS | DIASTOLIC BLOOD PRESSURE: 99 MMHG

## 2017-09-07 PROCEDURE — 76010000009 HC PAIN MGT 0 TO 30 MIN PROC: Performed by: PHYSICAL MEDICINE & REHABILITATION

## 2017-09-07 PROCEDURE — 74011636320 HC RX REV CODE- 636/320

## 2017-09-07 PROCEDURE — 74011000250 HC RX REV CODE- 250

## 2017-09-07 PROCEDURE — 77030003669 HC NDL SPN COOK -B: Performed by: PHYSICAL MEDICINE & REHABILITATION

## 2017-09-07 PROCEDURE — 77030003676 HC NDL SPN MPRI -A: Performed by: PHYSICAL MEDICINE & REHABILITATION

## 2017-09-07 PROCEDURE — 74011250636 HC RX REV CODE- 250/636

## 2017-09-07 RX ORDER — LORAZEPAM 1 MG/1
1 TABLET ORAL
COMMUNITY
End: 2018-05-23 | Stop reason: ALTCHOICE

## 2017-09-07 RX ORDER — LIDOCAINE HYDROCHLORIDE 10 MG/ML
INJECTION, SOLUTION EPIDURAL; INFILTRATION; INTRACAUDAL; PERINEURAL AS NEEDED
Status: DISCONTINUED | OUTPATIENT
Start: 2017-09-07 | End: 2017-09-07 | Stop reason: HOSPADM

## 2017-09-07 RX ORDER — DEXAMETHASONE SODIUM PHOSPHATE 100 MG/10ML
INJECTION INTRAMUSCULAR; INTRAVENOUS AS NEEDED
Status: DISCONTINUED | OUTPATIENT
Start: 2017-09-07 | End: 2017-09-07 | Stop reason: HOSPADM

## 2017-09-07 RX ORDER — ACETAMINOPHEN 325 MG/1
650 TABLET ORAL
COMMUNITY
End: 2018-05-23 | Stop reason: ALTCHOICE

## 2017-09-07 RX ORDER — DIAZEPAM 5 MG/1
5-20 TABLET ORAL ONCE
Status: CANCELLED | OUTPATIENT
Start: 2017-09-07 | End: 2017-09-07

## 2017-09-07 NOTE — PROCEDURES
PROCEDURE NOTE      Patient Name: Ovi Low    Date of Procedure: September 7, 2017    Preoperative Diagnosis:  Bulge of lumbar disc without myelopathy [M51.26]  Acute left lumbar radiculopathy [M54.16]    Post Operative Diagnosis:  Bulge of lumbar disc without myelopathy [M51.26]  Acute left lumbar radiculopathy [M54.16]    Procedure :    left L5 Selective Nerve Root Block      Consent:  Informed consent was obtained prior to the procedure. The patient was given the opportunity to ask questions regarding the procedure and its associated risks. In addition to the potential risks associated with the procedure itself, the patient was informed both verbally and in writing of the potential side effects of the use of glucocorticoid. The patient appeared to comprehend the informed consent and desired to have the procedure performed. Procedure: The patient was placed in the prone position on the fluoroscopy table and the back was prepped and draped in the usual sterile manner. The exact spinal level was  identified using fluoroscopy, and Lidocaine 1 % was injected locally, a # 22 gauge spinal needle was passed to the transverse process. The depth was noted and the needle redirected to pass inferior and approximately one cm anterior to the transverse process. YES  1 cc of Isovue M-200 was used to verify positioning in the epidural and paravertebral space and outlined the course of the spinal nerve into the epidural space. The same procedure was repeated at each spinal level indicated above. A total of 30 mg of preservative free Dexamethasone and 4 cc of Lidocaine was slowly injected. The patient tolerated the procedure well. The injection area was cleaned and bandaids applied. Not excessive bleeding was noted. Patient dressed and discharged to home with instructions. Discussion: The patient tolerated the procedure well.                                               Nirav Cherry, MD  September 7, 2017

## 2017-09-07 NOTE — DISCHARGE INSTRUCTIONS
[facility]   (ANA)  Dr. Loreta Nicole, Dr. Deb Ledesma Dr. Sunday Glancxiang not drive a car, operate heavy machinery or dangerous equipment for 24 hours. * Activity as tolerated; rest for the remainder of the day. * Resume pre-block medications including those for your family doctor. * Do not drink alcoholic beverages for 24 hours. Alcohol and the medications you have received may interact and cause an adverse reaction. * You may feel better this evening and worse tomorrow, as the numbing medications wears off and the steroid has yet to begin to work. After 48 hrs the steroid should begin to release bringing you relief. * You may shower this evening and remove any bandages. * Avoid hot tubs and heating pads for 24 hours. You may use cold packs on the procedure site as tolerated for the first 24 hours. * If a headache develops, drink plenty of fluids and rest.  Take over the counter medications for headache if needed. If the headache continues longer than 24 hours, call MD at the [address] [place] [address]. [phone]    * Continue taking pain medications as needed. * You may resume your regular diet if tolerated. Otherwise, start with sips of water and advance slowly. * If Diabetic: check your blood sugar three times a day for the next 3 days. If your sugar is greater than 300 call your family doctor. If your sugar is greater than 400, have someone transport you to the nearest Emergency Room. * If you experience any of the following problems, Please Call the [address] [place] [address] at [phone].         * Shortness of Breath    * Fever of 101 or higher    * Nausea / Vomiting    * Severe Headache    * Weakness or numbness in arms or legs that is not      resolving    * Prolonged increase in pain greater than 4 days      DISCHARGE SUMMARY from Nurse      PATIENT INSTRUCTIONS:    After oral sedation, for 24 hours or while taking prescription Narcotics:  · Limit your activities  · Do not drive and operate hazardous machinery  · Do not make important personal or business decisions  · Do  not drink alcoholic beverages  · If you have not urinated within 8 hours after discharge, please contact your surgeon on call. Report the following to your surgeon:  · Excessive pain, swelling, redness or odor of or around the surgical area  · Temperature over 101  · Nausea and vomiting lasting longer than 4 hours or if unable to take medications  · Any signs of decreased circulation or nerve impairment to extremity: change in color, persistent  numbness, tingling, coldness or increase pain  · Any questions            What to do at Home:  Recommended activity: Activity as tolerated, NO DRIVING FOR 12 Hours post injection          *  Please give a list of your current medications to your Primary Care Provider. *  Please update this list whenever your medications are discontinued, doses are      changed, or new medications (including over-the-counter products) are added. *  Please carry medication information at all times in case of emergency situations. These are general instructions for a healthy lifestyle:    No smoking/ No tobacco products/ Avoid exposure to second hand smoke    Surgeon General's Warning:  Quitting smoking now greatly reduces serious risk to your health. Obesity, smoking, and sedentary lifestyle greatly increases your risk for illness    A healthy diet, regular physical exercise & weight monitoring are important for maintaining a healthy lifestyle    You may be retaining fluid if you have a history of heart failure or if you experience any of the following symptoms:  Weight gain of 3 pounds or more overnight or 5 pounds in a week, increased swelling in our hands or feet or shortness of breath while lying flat in bed.   Please call your doctor as soon as you notice any of these symptoms; do not wait until your next office visit. Recognize signs and symptoms of STROKE:    F-face looks uneven    A-arms unable to move or move unevenly    S-speech slurred or non-existent    T-time-call 911 as soon as signs and symptoms begin-DO NOT go       Back to bed or wait to see if you get better-TIME IS BRAIN.

## 2017-09-21 NOTE — TELEPHONE ENCOUNTER
Not sure why you are routing this message from 2 weeks ago to me. I am not going to go against Dr Santo Martinez.

## 2017-09-27 RX ORDER — TOPIRAMATE 25 MG/1
75 TABLET ORAL 2 TIMES DAILY
Qty: 180 TAB | Refills: 0 | Status: SHIPPED | OUTPATIENT
Start: 2017-09-27 | End: 2017-11-03 | Stop reason: SDUPTHER

## 2017-09-27 NOTE — TELEPHONE ENCOUNTER
PATIENT CALLED FOR REFILL ON TOPAMAX MEDICATION FROM DR. PARRA. WOULD LIKE CALLED IN TO Nicho [facility]ares   TEL. [phone]. PATIENT TEL. [phone]. NOTE: PATIENT HAD AN APPOINTMENT TODAY WITH DR. PARRA BUT HAD TO CANCEL DUE TO A SICK CHILD AT HOME.

## 2017-09-27 NOTE — TELEPHONE ENCOUNTER
Note: Patient had an appointment today with Dr. Ariadna Eid but had to cancel due to a sick child at home. Date of Block: 09/07/2017 L5 SNRB   Last Visit: 08/04/2017 with MD Ariadna Eid    Next Appointment: noted to f/u after block; Pt canceled 09/27  Previous Refill Encounters: 05/22/2017 per MD Ariadna Eid #[id number] with 1 refill     Requested Prescriptions     Pending Prescriptions Disp Refills    topiramate (TOPAMAX) 25 mg tablet 180 Tab 0     Sig: Take 3 Tabs by mouth two (2) times a day.

## 2017-09-27 NOTE — TELEPHONE ENCOUNTER
Please let patient know I have sent a refill but she needs to make a block FU with Dr. Rey Fleischer

## 2017-11-03 RX ORDER — TOPIRAMATE 25 MG/1
75 TABLET ORAL 2 TIMES DAILY
Qty: 180 TAB | Refills: 0 | Status: SHIPPED | OUTPATIENT
Start: 2017-11-03 | End: 2017-11-14 | Stop reason: SDUPTHER

## 2017-11-03 NOTE — TELEPHONE ENCOUNTER
The patient canceled the 09/27 appt due to having a sick child at home. Date of Block: 09/07/2017 L5 SNRB    Last Visit: 08/04/2017 with MD Carlyle Sandra    Next Appointment: 11/14/2017 with MD Carlyle Sandra   Previous Refill Encounters: 09/27/2017 per NP Benjamin Hankins #[id number]     Requested Prescriptions     Pending Prescriptions Disp Refills    topiramate (TOPAMAX) 25 mg tablet 180 Tab 0     Sig: Take 3 Tabs by mouth two (2) times a day.

## 2017-11-14 ENCOUNTER — OFFICE VISIT (OUTPATIENT)
Dept: ORTHOPEDIC SURGERY | Age: 32
End: 2017-11-14

## 2017-11-14 VITALS
SYSTOLIC BLOOD PRESSURE: 144 MMHG | HEIGHT: 68 IN | RESPIRATION RATE: 16 BRPM | BODY MASS INDEX: 33.43 KG/M2 | DIASTOLIC BLOOD PRESSURE: 91 MMHG | WEIGHT: 220.6 LBS | HEART RATE: 72 BPM

## 2017-11-14 DIAGNOSIS — M51.26 LUMBAR HERNIATED DISC: ICD-10-CM

## 2017-11-14 DIAGNOSIS — M51.26 OTHER INTERVERTEBRAL DISC DISPLACEMENT, LUMBAR REGION: ICD-10-CM

## 2017-11-14 DIAGNOSIS — M51.36 OTHER INTERVERTEBRAL DISC DEGENERATION, LUMBAR REGION: ICD-10-CM

## 2017-11-14 DIAGNOSIS — M54.16 RADICULOPATHY, LUMBAR REGION: ICD-10-CM

## 2017-11-14 DIAGNOSIS — S39.012A STRAIN OF LUMBAR REGION, INITIAL ENCOUNTER: Primary | ICD-10-CM

## 2017-11-14 RX ORDER — TOPIRAMATE 25 MG/1
TABLET ORAL
Qty: 180 TAB | Refills: 2 | Status: SHIPPED | OUTPATIENT
Start: 2017-11-14 | End: 2018-03-30 | Stop reason: SDUPTHER

## 2017-11-14 RX ORDER — CYCLOBENZAPRINE HCL 10 MG
TABLET ORAL
Qty: 40 TAB | Refills: 0 | Status: SHIPPED | OUTPATIENT
Start: 2017-11-14 | End: 2018-05-23 | Stop reason: ALTCHOICE

## 2017-11-14 RX ORDER — TOPIRAMATE 25 MG/1
75 TABLET ORAL 2 TIMES DAILY
Qty: 180 TAB | Refills: 0 | Status: SHIPPED | OUTPATIENT
Start: 2017-11-14 | End: 2017-11-14 | Stop reason: SDUPTHER

## 2017-11-14 NOTE — PROGRESS NOTES
[facility]  [address], [address]  Phone: [phone]  Fax: [phone]        PROGRESS NOTE      HISTORY OF PRESENT ILLNESS:  The patient is a 28 y.o. female and was seen today for follow up of left-sided low back and hip pain extending intermittently into the LLE in an L5 distribution to the big toe since 6/20/17, no recent injury. Pt states pain does not extend to RLE posteriorly to ankle as frequently and has decreased in intensity unless she is sitting down for prolonged periods. Pt notes increased activity or twisting exacerbate her pain. She states she continues to have left foot paraesthesias. Note from [facility], [address] 4/22/16 indicates patient was seen for acute onset of right-sided low back pain that started in the beginning of April. According to the note, over the next week pain extended into the right lower extremity and she was treated with Flexeril, oral steroids as well as NSAIDs. She states Flexeril has provided a little relief. Per note, the patient had similar episode 10 years ago and had relief with injections by Dr. Sosa Tenorio. Pt was given a Medrol Dosepak on 6/23/17 which provided no relief. Pt continues with Topamax 75 mg BID. She frequently forgets to take her AM dose of Topamax. Pt completed physical therapy with minimal benefit. Pt denies h/o DM, stomach ulcers or bleeding disorders. She denies previous lumbar surgery. Pt denies change in bowel or bladder habits. She denies possibility of pregnancy. Pt denies h/o DM. Lumbar spine XR from 4/22/16 was reviewed. Per report, there were degenerative changes and scoliosis. No acute abnormality. Mild narrowing at L4-L5. Lumbar spine MRI dated 7/12/17 reviewed. Per report, at L2-3, small-to-moderate central disc protrusion with mild central stenosis. At L4-5, left central disc extrusion with severe left lateral recess narrowing.  At L5-S1, small right central disc protrusion with subtle right lateral recess narrowing. posterior radial annular fissures at L2-3, L4-5, and L5-S1. At her last clinical appointment, patient has a left central disc extrusion with severe left lateral recess narrowing at the L4-5 level to account for her symptoms. She elected to proceed with lumbar blocks. I ordered left L5 SNRB. Patient should continue on Topamax 75 mg BID and was given refills. She was also given refills of her Percocet. I recommended she increase the frequency of HEP to daily. The patient returns today with right-sided low back/buttock pain x 1 week after putting a towel away. She denies radicular symptoms into the RLE or left-sided symptoms at this time. She rates pain 6.5-8/10, consistent with her last visit (5-9/10). Pt underwent left-sided L5 SNRB on 9/7/17 with temporary improvemnet in symptoms x 1 month; she rated her pain 3/10 during that time. She continues with Topamax 75 mg BID. Pt now performs her HEP daily.  reviewed. Past Medical History:   Diagnosis Date    Hypertension 2014    pt states that pregnancy related        Social History     Social History    Marital status:      Spouse name: N/A    Number of children: N/A    Years of education: N/A     Occupational History    Not on file. Social History Main Topics    Smoking status: Never Smoker    Smokeless tobacco: Never Used    Alcohol use No    Drug use: No    Sexual activity: Yes     Partners: Male     Other Topics Concern    Not on file     Social History Narrative       Current Outpatient Prescriptions   Medication Sig Dispense Refill    cyclobenzaprine (FLEXERIL) 10 mg tablet Take 1 po q daily - TID prn spasms  Indications: Muscle Spasm 40 Tab 0    topiramate (TOPAMAX) 25 mg tablet Take 3 po q BID for neuropathic pain 180 Tab 2    ibuprofen (MOTRIN IB) 200 mg tablet Take 400-600 mg by mouth every six (6) hours as needed.       AFLURIA [id number], PF, syrg injection       acetaminophen (TYLENOL) 325 mg tablet Take 650 mg by mouth every four (4) hours as needed for Pain.  LORazepam (ATIVAN) 1 mg tablet Take 1 mg by mouth every four (4) hours as needed for Anxiety.  oxyCODONE-acetaminophen (PERCOCET) 5-325 mg per tablet Take 1 Tab by mouth three (3) times daily. Max Daily Amount: 3 Tabs. 21 Tab 0    naproxen (NAPROSYN) 500 mg tablet Take 1 Tab by mouth two (2) times daily (with meals). 30 Tab 0       No Known Allergies       PHYSICAL EXAMINATION    Visit Vitals    BP (!) 144/91    Pulse 72    Resp 16    Ht 5' 8\" (1.727 m)    Wt 220 lb 9.6 oz (100.1 kg)    BMI 33.54 kg/m2       CONSTITUTIONAL: NAD, A&O x 3  SENSATION: Diffusely tender throughout the right-sided low back/buttock. Intact to light touch throughout  RANGE OF MOTION: The patient has full passive range of motion in all four extremities. MOTOR:  Straight Leg Raise: Negative, bilateral               Hip Flex Knee Ext Knee Flex Ankle DF GTE Ankle PF Tone   Right +4/5 +4/5 +4/5 +4/5 +4/5 +4/5 +4/5   Left +4/5 +4/5 +4/5 +4/5 +4/5 +4/5 +4/5       ASSESSMENT   Diagnoses and all orders for this visit:    1. Strain of lumbar region, initial encounter  -     cyclobenzaprine (FLEXERIL) 10 mg tablet; Take 1 po q daily - TID prn spasms  Indications: Muscle Spasm  -     topiramate (TOPAMAX) 25 mg tablet; Take 3 po q BID for neuropathic pain    2. Lumbar herniated disc  -     cyclobenzaprine (FLEXERIL) 10 mg tablet; Take 1 po q daily - TID prn spasms  Indications: Muscle Spasm  -     topiramate (TOPAMAX) 25 mg tablet; Take 3 po q BID for neuropathic pain    3. Other intervertebral disc degeneration, lumbar region  -     cyclobenzaprine (FLEXERIL) 10 mg tablet; Take 1 po q daily - TID prn spasms  Indications: Muscle Spasm  -     topiramate (TOPAMAX) 25 mg tablet; Take 3 po q BID for neuropathic pain    4. Radiculopathy, lumbar region  -     cyclobenzaprine (FLEXERIL) 10 mg tablet;  Take 1 po q daily - TID prn spasms  Indications: Muscle Spasm  -     topiramate (TOPAMAX) 25 mg tablet; Take 3 po q BID for neuropathic pain    5. Other intervertebral disc displacement, lumbar region  -     cyclobenzaprine (FLEXERIL) 10 mg tablet; Take 1 po q daily - TID prn spasms  Indications: Muscle Spasm  -     topiramate (TOPAMAX) 25 mg tablet; Take 3 po q BID for neuropathic pain          IMPRESSION AND PLAN:  Patient is neurologically intact. She has diffuse tenderness throughout the right-sided back/buttock area. I largely suspect a musculoskeletal component to this and will give her a Rx for Flexeril prn. She will increase her Motrin to 800 mg TID x 2 weeks with good. She was given refills of her Topamax 75 mg BID. I encourage patient to perform her HEP daily. I will see the patient back in 3 month's time or earlier if needed. Written by Lola Reynolds, as dictated by Radha Carpenter MD  I examined the patient, reviewed and agree with the note.

## 2017-11-14 NOTE — MR AVS SNAPSHOT
Visit Information Date & Time Provider Department Dept. Phone Encounter #  
 11/14/2017  9:15 AM Robby Weston, [address] [facility] [place]  Follow-up Instructions Return in about 3 months (around 2/14/2018). Upcoming Health Maintenance Date Due DTaP/Tdap/Td series (1 - Tdap) 8/5/2006 PAP AKA CERVICAL CYTOLOGY 5/23/2017 Influenza Age 5 to Adult 8/1/2017 Allergies as of 11/14/2017  Review Complete On: 11/14/2017 By: Robby Weston MD  
 No Known Allergies Current Immunizations  Reviewed on 11/16/2015 Name Date Influenza Vaccine (Quad) PF 11/16/2015 11:47 AM  
  
 Not reviewed this visit You Were Diagnosed With   
  
 Codes Comments Strain of lumbar region, initial encounter    -  Primary ICD-10-CM: K40.908J ICD-9-CM: 847.2 Lumbar herniated disc     ICD-10-CM: M51.26 
ICD-9-CM: 722.10 Other intervertebral disc degeneration, lumbar region     ICD-10-CM: M51.36 
ICD-9-CM: 722.52 Radiculopathy, lumbar region     ICD-10-CM: M54.16 
ICD-9-CM: 724.4 Other intervertebral disc displacement, lumbar region     ICD-10-CM: M51.26 
ICD-9-CM: 722.10 Vitals BP Pulse Resp Height(growth percentile) Weight(growth percentile) BMI  
 (!) 144/91 72 16 5' 8\" (1.727 m) 220 lb 9.6 oz (100.1 kg) 33.54 kg/m2 OB Status Smoking Status Having regular periods Never Smoker BMI and BSA Data Body Mass Index Body Surface Area  
 33.54 kg/m 2 2.19 m 2 Preferred Pharmacy Pharmacy Name Phone [facility] [address], [address] [phone] Your Updated Medication List  
  
   
This list is accurate as of: 11/14/17 10:32 AM.  Always use your most recent med list.  
  
  
  
  
 AFLURIA [id number] (PF) Syrg injection Generic drug:  influenza vaccine 2017-18 (5 yrs+)(PF)  
  
 ATIVAN 1 mg tablet Generic drug:  LORazepam  
 Take 1 mg by mouth every four (4) hours as needed for Anxiety. cyclobenzaprine 10 mg tablet Commonly known as:  FLEXERIL Take 1 po q daily - TID prn spasms  Indications: Muscle Spasm MOTRIN  mg tablet Generic drug:  ibuprofen Take 400-600 mg by mouth every six (6) hours as needed. naproxen 500 mg tablet Commonly known as:  NAPROSYN Take 1 Tab by mouth two (2) times daily (with meals). oxyCODONE-acetaminophen 5-325 mg per tablet Commonly known as:  PERCOCET Take 1 Tab by mouth three (3) times daily. Max Daily Amount: 3 Tabs. topiramate 25 mg tablet Commonly known as:  TOPAMAX Take 3 po q BID for neuropathic pain TYLENOL 325 mg tablet Generic drug:  acetaminophen Take 650 mg by mouth every four (4) hours as needed for Pain. Prescriptions Sent to Pharmacy Refills  
 cyclobenzaprine (FLEXERIL) 10 mg tablet 0 Sig: Take 1 po q daily - TID prn spasms  Indications: Muscle Spasm Class: Normal  
 Pharmacy: [facility] Ph #: [phone]  
 topiramate (TOPAMAX) 25 mg tablet 2 Sig: Take 3 po q BID for neuropathic pain  
 Class: Normal  
 Pharmacy: [facility] [address] Ph #: [phone] Follow-up Instructions Return in about 3 months (around 2/14/2018). Introducing [facility]! [employer] introduces [employer] patient portal. Now you can access parts of your medical record, email your doctor's office, and request medication refills online. 1. In your internet browser, go to [email or web address] 2. Click on the First Time User? Click Here link in the Sign In box. You will see the New Member Sign Up page. 3. Enter your [employer] Access Code exactly as it appears below. You will not need to use this code after youve completed the sign-up process.  If you do not sign up before the expiration date, you must request a new code. · [employer] Access Code: [email or web address] Expires: 2/12/2018  9:17 AM 
 
4. Enter the last four digits of your Social Security Number (xxxx) and Date of Birth (mm/dd/yyyy) as indicated and click Submit. You will be taken to the next sign-up page. 5. Create a [employer] ID. This will be your [employer] login ID and cannot be changed, so think of one that is secure and easy to remember. 6. Create a [employer] password. You can change your password at any time. 7. Enter your Password Reset Question and Answer. This can be used at a later time if you forget your password. 8. Enter your e-mail address. You will receive e-mail notification when new information is available in [address] 9. Click Sign Up. You can now view and download portions of your medical record. 10. Click the Download Summary menu link to download a portable copy of your medical information. If you have questions, please visit the Frequently Asked Questions section of the [employer] website. Remember, [employer] is NOT to be used for urgent needs. For medical emergencies, dial 911. Now available from your iPhone and Android! Please provide this summary of care documentation to your next provider. Your primary care clinician is listed as Areatha Chalk. Ula Goodpasture. If you have any questions after today's visit, please call [phone].

## 2018-03-30 DIAGNOSIS — S39.012A STRAIN OF LUMBAR REGION, INITIAL ENCOUNTER: ICD-10-CM

## 2018-03-30 DIAGNOSIS — M51.36 OTHER INTERVERTEBRAL DISC DEGENERATION, LUMBAR REGION: ICD-10-CM

## 2018-03-30 DIAGNOSIS — M51.26 LUMBAR HERNIATED DISC: ICD-10-CM

## 2018-03-30 DIAGNOSIS — M51.26 OTHER INTERVERTEBRAL DISC DISPLACEMENT, LUMBAR REGION: ICD-10-CM

## 2018-03-30 DIAGNOSIS — M54.16 RADICULOPATHY, LUMBAR REGION: ICD-10-CM

## 2018-03-30 NOTE — TELEPHONE ENCOUNTER
Patient called in states she thought she had one more refill left on Topiramate (TOPAMAX) 25 mg tablet   And per her pharmacy she does not. ([facility] [id number] [place] [address]) Patient made an appt for 04/06/18 but will finish her medication on Sunday 04/01/18. Please contact patient and advise if she can get enough to last until her appt. At [phone].

## 2018-03-30 NOTE — TELEPHONE ENCOUNTER
Last Visit: 11/14/2017 with MD Kathi Keyes    Next Appointment: 04/06/2018 with MD Kathi Keyes   Previous Refill Encounters: 11/14/2017 per MD Kathi Keyes #180 with 2 refills     Requested Prescriptions     Pending Prescriptions Disp Refills    topiramate (TOPAMAX) 25 mg tablet 180 Tab 0     Sig: Take 3 po q BID for neuropathic pain

## 2018-04-02 RX ORDER — TOPIRAMATE 25 MG/1
TABLET ORAL
Qty: 180 TAB | Refills: 0 | Status: SHIPPED | OUTPATIENT
Start: 2018-04-02 | End: 2019-04-22 | Stop reason: ALTCHOICE

## 2018-04-02 NOTE — TELEPHONE ENCOUNTER
Pt has appt for 4/6/18. There was never any other appt scheduled. Could have possibly been a mix up at the .

## 2018-05-23 ENCOUNTER — OFFICE VISIT (OUTPATIENT)
Dept: INTERNAL MEDICINE CLINIC | Age: 33
End: 2018-05-23

## 2018-05-23 VITALS
SYSTOLIC BLOOD PRESSURE: 142 MMHG | HEIGHT: 68 IN | WEIGHT: 228 LBS | HEART RATE: 86 BPM | OXYGEN SATURATION: 98 % | DIASTOLIC BLOOD PRESSURE: 100 MMHG | TEMPERATURE: 98.5 F | BODY MASS INDEX: 34.56 KG/M2

## 2018-05-23 DIAGNOSIS — J02.9 PHARYNGITIS, UNSPECIFIED ETIOLOGY: Primary | ICD-10-CM

## 2018-05-23 DIAGNOSIS — E66.9 OBESITY (BMI 30-39.9): ICD-10-CM

## 2018-05-23 DIAGNOSIS — R03.0 ELEVATED BLOOD PRESSURE READING: ICD-10-CM

## 2018-05-23 RX ORDER — AZITHROMYCIN 250 MG/1
TABLET, FILM COATED ORAL
Qty: 6 TAB | Refills: 0 | Status: SHIPPED | OUTPATIENT
Start: 2018-05-23 | End: 2018-05-28

## 2018-05-23 NOTE — PROGRESS NOTES
Karsten Rey 1985, is a 28 y.o. female, who is seen today for sore throat cough blood pressure concerns or other symptoms. For 3 days she has had a sore throat but no fever. For 1 day she has had a nonproductive cough. No dyspnea or wheezing and no chills. She is also concerned that over the last couple of months her blood pressure might be high again because she is having facial flushing periodically she is stressed, occasionally her chest feels a little tight. She has an  at a  and thinks she might have strep but also stressed at work. She had elevated blood pressure postpartum and was on medication for 3 months and then was taken off medicine and she thinks her blood pressures been fine since then. She has not checked it recently. Past Medical History:   Diagnosis Date    Hypertension 2014    pt states that pregnancy related     Current Outpatient Prescriptions   Medication Sig Dispense Refill    topiramate (TOPAMAX) 25 mg tablet Take 3 po q BID for neuropathic pain 180 Tab 0    ibuprofen (MOTRIN IB) 200 mg tablet Take 400-600 mg by mouth every six (6) hours as needed. Visit Vitals    BP (!) 142/100 (BP 1 Location: Left arm, BP Patient Position: Sitting)    Pulse 86    Temp 98.5 °F (36.9 °C) (Oral)    Ht 5' 8\" (1.727 m)    Wt 228 lb (103.4 kg)    SpO2 98%    BMI 34.67 kg/m2     Pharynx reveals no exudate or drainage but there is a fair amount of redness. Nasal passages reveal minimal clear tenacious secretions. Neck reveals no adenopathy. Mild anterior cervical tenderness bilaterally. Lungs are clear to percussion. Good breath sounds with no wheezing or crackles. Heart reveals a regular rhythm with normal S1 and S2 no murmur gallop click or rub. Chest wall is nontender. Assessment: #1.  Upper respiratory infection, a lot of exposures at work but no indication of strep per se. Will treat with azithromycin.   #2.  Elevated blood pressure, she is going to start checking blood pressure at work, there is a good blood pressure cuff there. If it remains high she may need treatment, she has a strong family history of high blood pressure she says. #3. The facial flushing and chest symptoms seem to be related to stress and anxiety, blood pressure is not nearly high enough to cause somatic symptoms like this. That was discussed with her. #4. She is obese and I recommended she work on weight loss as well. Follow-up not yet scheduled. Blood pressure is high she will need medicine as above. Joe Fitzgerald Se, MD FACP    Please note: This document has been produced using voice recognition software. Unrecognized errors in transcription may be present.

## 2018-05-23 NOTE — PROGRESS NOTES
1. Have you been to the ER, urgent care clinic or hospitalized since your last visit? NO.     2. Have you seen or consulted any other health care providers outside of the [address] [place] [address] since your last visit (Include any pap smears or colon screening)? NO      Do you have an Advanced Directive? NO    Would you like information on Advanced Directives? NO    Doctor notified of elevated BP.

## 2018-05-23 NOTE — MR AVS SNAPSHOT
[address] [facility], [address] 
[phone] Patient: Le Tapia MRN: [id number]:[id number] Visit Information Date & Time Provider Department Dept. Phone Encounter #  
 5/23/2018 11:00 AM Gloria George MD [facility] [address] [place] [address] [id number] Upcoming Health Maintenance Date Due DTaP/Tdap/Td series (1 - Tdap) 8/5/2006 PAP AKA CERVICAL CYTOLOGY 5/23/2017 Influenza Age 5 to Adult 8/1/2018 Allergies as of 5/23/2018  Review Complete On: 5/23/2018 By: Ford Mcmanus LPN No Known Allergies Current Immunizations  Reviewed on 11/16/2015 Name Date Influenza Vaccine (Quad) PF 11/16/2015 11:47 AM  
  
 Not reviewed this visit You Were Diagnosed With   
  
 Codes Comments Pharyngitis, unspecified etiology    -  Primary ICD-10-CM: J02.9 ICD-9-CM: 580 Elevated blood pressure reading     ICD-10-CM: R03.0 ICD-9-CM: 796.2 Obesity (BMI 30-39. 9)     ICD-10-CM: E66.9 ICD-9-CM: 278.00 Vitals BP Pulse Temp Height(growth percentile) Weight(growth percentile) SpO2  
 140/90 (BP 1 Location: Left arm, BP Patient Position: Sitting) 86 98.5 °F (36.9 °C) (Oral) 5' 8\" (1.727 m) 228 lb (103.4 kg) 98% BMI OB Status Smoking Status 34.67 kg/m2 Having regular periods Never Smoker Vitals History BMI and BSA Data Body Mass Index Body Surface Area  
 34.67 kg/m 2 2.23 m 2 Preferred Pharmacy Pharmacy Name Phone [facility] [address], [address] [phone] Your Updated Medication List  
  
   
This list is accurate as of 5/23/18 11:35 AM.  Always use your most recent med list.  
  
  
  
  
 AFLURIA [id number] (PF) Syrg injection Generic drug:  influenza vaccine 2017-18 (5 yrs+)(PF)  
  
 ATIVAN 1 mg tablet Generic drug:  LORazepam  
Take 1 mg by mouth every four (4) hours as needed for Anxiety. cyclobenzaprine 10 mg tablet Commonly known as:  FLEXERIL Take 1 po q daily - TID prn spasms  Indications: Muscle Spasm MOTRIN  mg tablet Generic drug:  ibuprofen Take 400-600 mg by mouth every six (6) hours as needed. naproxen 500 mg tablet Commonly known as:  NAPROSYN Take 1 Tab by mouth two (2) times daily (with meals). oxyCODONE-acetaminophen 5-325 mg per tablet Commonly known as:  PERCOCET Take 1 Tab by mouth three (3) times daily. Max Daily Amount: 3 Tabs. topiramate 25 mg tablet Commonly known as:  TOPAMAX Take 3 po q BID for neuropathic pain TYLENOL 325 mg tablet Generic drug:  acetaminophen Take 650 mg by mouth every four (4) hours as needed for Pain. Introducing [facility]! [facility] introduces [employer] patient portal. Now you can access parts of your medical record, email your doctor's office, and request medication refills online. 1. In your internet browser, go to [email or web address] 2. Click on the First Time User? Click Here link in the Sign In box. You will see the New Member Sign Up page. 3. Enter your [employer] Access Code exactly as it appears below. You will not need to use this code after youve completed the sign-up process. If you do not sign up before the expiration date, you must request a new code. · [employer] Access Code: [email or web address] 
Expires: 7/3/2018  4:13 PM 
 
4. Enter the last four digits of your Social Security Number (xxxx) and Date of Birth (mm/dd/yyyy) as indicated and click Submit. You will be taken to the next sign-up page. 5. Create a [employer] ID. This will be your [employer] login ID and cannot be changed, so think of one that is secure and easy to remember. 6. Create a [employer] password. You can change your password at any time. 7. Enter your Password Reset Question and Answer. This can be used at a later time if you forget your password. 8. Enter your e-mail address. You will receive e-mail notification when new information is available in [address] 9. Click Sign Up. You can now view and download portions of your medical record. 10. Click the Download Summary menu link to download a portable copy of your medical information. If you have questions, please visit the Frequently Asked Questions section of the [employer] website. Remember, [employer] is NOT to be used for urgent needs. For medical emergencies, dial 911. Now available from your iPhone and Android! Please provide this summary of care documentation to your next provider. Your primary care clinician is listed as Garo Barbosa. Jaquelin Wright. If you have any questions after today's visit, please call [phone].

## 2019-04-22 ENCOUNTER — OFFICE VISIT (OUTPATIENT)
Dept: INTERNAL MEDICINE CLINIC | Age: 34
End: 2019-04-22

## 2019-04-22 VITALS
DIASTOLIC BLOOD PRESSURE: 88 MMHG | BODY MASS INDEX: 33.65 KG/M2 | TEMPERATURE: 98.3 F | HEIGHT: 68 IN | OXYGEN SATURATION: 98 % | RESPIRATION RATE: 12 BRPM | HEART RATE: 101 BPM | SYSTOLIC BLOOD PRESSURE: 138 MMHG | WEIGHT: 222 LBS

## 2019-04-22 DIAGNOSIS — M51.26 LUMBAR HERNIATED DISC: ICD-10-CM

## 2019-04-22 DIAGNOSIS — M51.26 OTHER INTERVERTEBRAL DISC DISPLACEMENT, LUMBAR REGION: ICD-10-CM

## 2019-04-22 DIAGNOSIS — M54.16 RADICULOPATHY, LUMBAR REGION: Primary | ICD-10-CM

## 2019-04-22 DIAGNOSIS — S39.012A STRAIN OF LUMBAR REGION, INITIAL ENCOUNTER: ICD-10-CM

## 2019-04-22 DIAGNOSIS — M51.36 OTHER INTERVERTEBRAL DISC DEGENERATION, LUMBAR REGION: ICD-10-CM

## 2019-04-22 RX ORDER — TOPIRAMATE 100 MG/1
100 TABLET, FILM COATED ORAL 2 TIMES DAILY
Qty: 60 TAB | Refills: 3 | Status: SHIPPED | OUTPATIENT
Start: 2019-04-22 | End: 2019-08-30 | Stop reason: SDUPTHER

## 2019-04-22 RX ORDER — TOPIRAMATE 25 MG/1
TABLET ORAL
Qty: 180 TAB | Refills: 0 | Status: CANCELLED | OUTPATIENT
Start: 2019-04-22

## 2019-04-22 RX ORDER — DEXAMETHASONE 4 MG/1
TABLET ORAL
Qty: 20 TAB | Refills: 0 | Status: SHIPPED | OUTPATIENT
Start: 2019-04-22 | End: 2019-07-02 | Stop reason: ALTCHOICE

## 2019-04-22 NOTE — PROGRESS NOTES
Sebastian Hargrove 1985, is a 35 y.o. female, who is seen today for lumbar pain and sciatica. She tells me she has had back pain to varying extent ever since she was a senior in high school. She is known to have degenerative disc disease with sciatica on the right and occasionally on the left has had several epidural injections and also has gotten decent relief with topiramate which has been dosed at 75 mg twice a day. She ran out of this a couple of weeks ago. She tells me that 2 weeks ago she was on the floor and twisted starting to get up and felt sharp pain in her back and over the next several days the sharp pain got better but sciatica became more ingrained on the right from the lower lumbar area in the posterior thigh to about the knee. It hurts her all the time but certain activities seem to be worse and even sitting for very long it hurts. She is here with her young child today. She has had oral steroids in the past which sometimes helps and sometimes not so much. She finds it very expensive to keep seeing her spine specialist every month or so to get refills on topiramate or to have injections and he says she has had several MRIs. I do not see evidence of all those visits and MRIs in her chart, she sees Dr. Candace Box. She also has a history of obesity and slightly elevated blood pressure in the past and her weight is currently down 6 pounds from what it was a year ago. She has not required antihypertensive medicine so far. Past Medical History:  
Diagnosis Date  Hypertension 2014  
 pt states that pregnancy related Current Outpatient Medications Medication Sig Dispense Refill  ibuprofen (MOTRIN IB) 200 mg tablet Take 400-600 mg by mouth every six (6) hours as needed. Visit Vitals /88 (BP 1 Location: Left arm, BP Patient Position: Sitting) Pulse (!) 101 Temp 98.3 °F (36.8 °C) (Oral) Resp 12 Ht 5' 8\" (1.727 m) Wt 222 lb (100.7 kg) SpO2 98% BMI 33.75 kg/m² Forward flexion causes right lumbar pain at about 20 degrees of flexion. There is no tenderness along the spine or in the lumbar area but in the upper right buttocks there is mild tenderness. Gait is normal.  Straight leg raising causes pain in the upper posterior lateral thigh and buttocks at about 35 degrees. No pain with straight leg raising of the left leg. Deep tendon reflexes are 2+ at both knees and 2+ left ankle jerk but only trace right ankle jerk. Assessment: #1.  Lumbar pain for 18 years, she has components of muscle strain pain as well as sciatic pain with most recent MRI in her chart indicative of degenerative changes but not really surgical indications. She has no weakness but does have diminished right ankle jerk consistent with injury to the right sciatic nerve. Talked to her at length about options for further treatment at this point we will increase topiramate to 100 mg twice a day and treat with dexamethasone for her current sciatic pain, I have told her that if she gets very good relief after 5 days she could stop and use the rest of the prescription for another episode of the future. Told her the reasons for using oral steroids versus epidural injections to include ease of use and the fact that it is much cheaper, but that she cannot use oral steroids frequently throughout her lifetime or end up with steroid side effects. #2.  Obesity, of encouraged her to work on weight loss. #3.  I do think she would be better off with some physical therapy but that will be addressed again in the future here or by her spine specialist.  #4.  Borderline high blood pressure, sometimes blood pressure slightly elevated, I have recommended weight loss and no added salt diet. Follow-up here is not scheduled, presumably she will see her spine specialist for follow-up told her I could see her as needed in the future. Joe Hobson, [address] 
 
 Please note: This document has been produced using voice recognition software. Unrecognized errors in transcription may be present.

## 2019-07-02 ENCOUNTER — OFFICE VISIT (OUTPATIENT)
Dept: INTERNAL MEDICINE CLINIC | Age: 34
End: 2019-07-02

## 2019-07-02 VITALS
BODY MASS INDEX: 33.37 KG/M2 | HEART RATE: 102 BPM | DIASTOLIC BLOOD PRESSURE: 88 MMHG | HEIGHT: 68 IN | SYSTOLIC BLOOD PRESSURE: 130 MMHG | RESPIRATION RATE: 12 BRPM | OXYGEN SATURATION: 98 % | TEMPERATURE: 98.4 F | WEIGHT: 220.2 LBS

## 2019-07-02 DIAGNOSIS — F41.9 ANXIETY: ICD-10-CM

## 2019-07-02 DIAGNOSIS — F41.0 PANIC ATTACKS: ICD-10-CM

## 2019-07-02 DIAGNOSIS — J06.9 UPPER RESPIRATORY TRACT INFECTION, UNSPECIFIED TYPE: Primary | ICD-10-CM

## 2019-07-02 RX ORDER — CETIRIZINE HCL 10 MG
TABLET ORAL
COMMUNITY

## 2019-07-02 RX ORDER — PSEUDOEPHEDRINE HCL 30 MG
TABLET ORAL
COMMUNITY

## 2019-07-02 RX ORDER — ESCITALOPRAM OXALATE 10 MG/1
10 TABLET ORAL DAILY
Qty: 30 TAB | Refills: 1 | Status: SHIPPED | OUTPATIENT
Start: 2019-07-02 | End: 2019-08-30 | Stop reason: SDUPTHER

## 2019-07-02 RX ORDER — AZITHROMYCIN 250 MG/1
TABLET, FILM COATED ORAL
Qty: 6 TAB | Refills: 0 | Status: SHIPPED | OUTPATIENT
Start: 2019-07-02 | End: 2019-07-07

## 2019-07-02 NOTE — PROGRESS NOTES
Maeve Granados 1985, is a 35 y.o. female, who is seen today for evaluation of a 4-day history of worsening respiratory symptoms. She started with cough and nasal congestion with postnasal drip but not blowing her nose. It has continued to get worse and she is coughing more frequently now. Bringing up green mucus at times. She has not had any chills and does not think she has had a fever. Not short of breath. 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 She is tried Prilosec without benefit. The chest pain always accompanies a sense of anxiety and panic. Past Medical History:   Diagnosis Date    Hypertension 2014    pt states that pregnancy related     Current Outpatient Medications   Medication Sig Dispense Refill    pseudoephedrine (SUDAFED) 30 mg tablet Take  by mouth every four (4) hours as needed for Congestion.  cetirizine (ZYRTEC) 10 mg tablet Take  by mouth.  topiramate (TOPAMAX) 100 mg tablet Take 1 Tab by mouth two (2) times a day. 60 Tab 3    ibuprofen (MOTRIN IB) 200 mg tablet Take 400-600 mg by mouth every six (6) hours as needed. Visit Vitals  /88 (BP 1 Location: Left arm, BP Patient Position: Sitting)   Pulse (!) 102   Temp 98.4 °F (36.9 °C) (Oral)   Resp 12   Ht 5' 8\" (1.727 m)   Wt 220 lb 3.2 oz (99.9 kg)   SpO2 98%   BMI 33.48 kg/m²     Ear canals and tympanic membranes appear normal with good light reflex bilaterally. Oral cavity reveals no redness exudate or drainage. Nasal passages reveal dark green mucus bilaterally more so on the right. Neck reveals no adenopathy or tenderness. Lungs are clear to percussion. Good breath sounds with no wheezing or crackles. Assessment: #1.  Upper respiratory infection with purulent secretions, will treat with azithromycin. #2.   Anxiety with panic attacks going on particularly the last 6 months and more so the last 2 months. I talked to her about the various options and she agrees to try Lexapro. Splane to her the various side effects with Lexapro, and the fact that usually side effects disappear after being on the drug for a while. Follow-up in about 6 weeks or sooner if needed    Joe Storm MD FACP    Please note: This document has been produced using voice recognition software. Unrecognized errors in transcription may be present. This visit lasted 25 minutes, greater than 50% of the time spent counseling mainly on anxiety and panic attacks and their treatment but also discussed the upper a story symptoms and the fact that I do not recommend anything else besides azithromycin for the cough, she could use Robitussin-DM but I would not use something stronger.

## 2019-08-30 RX ORDER — TOPIRAMATE 100 MG/1
100 TABLET, FILM COATED ORAL 2 TIMES DAILY
Qty: 60 TAB | Refills: 3 | Status: CANCELLED | OUTPATIENT
Start: 2019-08-30

## 2019-08-30 RX ORDER — ESCITALOPRAM OXALATE 10 MG/1
10 TABLET ORAL DAILY
Qty: 30 TAB | Refills: 1 | Status: CANCELLED | OUTPATIENT
Start: 2019-08-30

## 2019-09-03 RX ORDER — ESCITALOPRAM OXALATE 10 MG/1
TABLET ORAL
Qty: 30 TAB | Refills: 0 | Status: SHIPPED | OUTPATIENT
Start: 2019-09-03 | End: 2019-10-03 | Stop reason: SDUPTHER

## 2019-09-03 RX ORDER — TOPIRAMATE 100 MG/1
TABLET, FILM COATED ORAL
Qty: 60 TAB | Refills: 2 | Status: SHIPPED | OUTPATIENT
Start: 2019-09-03 | End: 2019-12-06 | Stop reason: SDUPTHER

## 2019-10-03 NOTE — TELEPHONE ENCOUNTER
Last Visit: 7/2/19 with MD Macrina Joe  Next Appointment: f/u 6 weeks  Previous Refill Encounter(s): 9/3/19 #30    Requested Prescriptions     Pending Prescriptions Disp Refills    escitalopram oxalate (LEXAPRO) 10 mg tablet 90 Tab 0     Sig: Take 1 Tab by mouth daily.

## 2019-10-04 RX ORDER — ESCITALOPRAM OXALATE 10 MG/1
10 TABLET ORAL DAILY
Qty: 90 TAB | Refills: 0 | Status: SHIPPED | OUTPATIENT
Start: 2019-10-04 | End: 2020-01-03

## 2019-12-06 RX ORDER — TOPIRAMATE 100 MG/1
100 TABLET, FILM COATED ORAL 2 TIMES DAILY
Qty: 180 TAB | Refills: 1 | Status: SHIPPED | OUTPATIENT
Start: 2019-12-06 | End: 2020-06-15 | Stop reason: SDUPTHER

## 2019-12-06 NOTE — TELEPHONE ENCOUNTER
Last Visit: 7/2/19 with MD Del Bernal  Next Appointment: none  Previous Refill Encounter(s): 9/3/19 #60 with 2 refills    Requested Prescriptions     Pending Prescriptions Disp Refills    topiramate (TOPAMAX) 100 mg tablet 180 Tab 3     Sig: Take 1 Tab by mouth two (2) times a day.

## 2020-01-03 RX ORDER — ESCITALOPRAM OXALATE 10 MG/1
TABLET ORAL
Qty: 90 TAB | Refills: 0 | Status: CANCELLED | OUTPATIENT
Start: 2020-01-03

## 2020-01-03 RX ORDER — ESCITALOPRAM OXALATE 10 MG/1
TABLET ORAL
Qty: 90 TAB | Refills: 0 | Status: SHIPPED | OUTPATIENT
Start: 2020-01-03 | End: 2020-04-03 | Stop reason: SDUPTHER

## 2020-04-03 RX ORDER — ESCITALOPRAM OXALATE 10 MG/1
10 TABLET ORAL DAILY
Qty: 90 TAB | Refills: 1 | Status: SHIPPED | OUTPATIENT
Start: 2020-04-03 | End: 2020-10-06 | Stop reason: SDUPTHER

## 2020-04-03 NOTE — TELEPHONE ENCOUNTER
Last Visit: 7/2/19 with MD Marlena Murcia  Next Appointment: none  Previous Refill Encounter(s): 1/3/20 #90    Requested Prescriptions     Pending Prescriptions Disp Refills    escitalopram oxalate (LEXAPRO) 10 mg tablet 90 Tab 3     Sig: Take 1 Tab by mouth daily.

## 2020-06-15 RX ORDER — TOPIRAMATE 100 MG/1
100 TABLET, FILM COATED ORAL 2 TIMES DAILY
Qty: 180 TAB | Refills: 1 | Status: SHIPPED | OUTPATIENT
Start: 2020-06-15

## 2020-06-15 NOTE — TELEPHONE ENCOUNTER
Last Visit: 7/2/19 with MD Chio Pineda  Next Appointment: none  Previous Refill Encounter(s): 12/6/19 #180 with 1 refill    Requested Prescriptions     Pending Prescriptions Disp Refills    topiramate (TOPAMAX) 100 mg tablet 180 Tab 1     Sig: Take 1 Tab by mouth two (2) times a day.

## 2020-07-06 RX ORDER — ESCITALOPRAM OXALATE 10 MG/1
10 TABLET ORAL DAILY
Qty: 90 TAB | Refills: 1 | Status: CANCELLED | OUTPATIENT
Start: 2020-07-06

## 2020-10-06 NOTE — TELEPHONE ENCOUNTER
Please contact patient for scheduling. Thanks        Last visit 07/02/2019 MD Lian Goodrich   Next appointment None   Previous refill encounter(s) 04/03/2020 Lexapro #90 with 1 refill     Requested Prescriptions     Pending Prescriptions Disp Refills    escitalopram oxalate (LEXAPRO) 10 mg tablet 90 Tab 1     Sig: Take 1 Tab by mouth daily.

## 2020-10-07 RX ORDER — ESCITALOPRAM OXALATE 10 MG/1
10 TABLET ORAL DAILY
Qty: 30 TAB | Refills: 0 | Status: SHIPPED | OUTPATIENT
Start: 2020-10-07 | End: 2020-11-19

## 2020-11-18 ENCOUNTER — TELEPHONE (OUTPATIENT)
Dept: INTERNAL MEDICINE CLINIC | Age: 35
End: 2020-11-18

## 2020-11-25 PROBLEM — F41.0 PANIC ATTACKS: Status: RESOLVED | Noted: 2019-07-02 | Resolved: 2020-11-25

## 2020-11-25 PROBLEM — M54.16 LUMBAR RADICULOPATHY: Status: RESOLVED | Noted: 2017-08-04 | Resolved: 2020-11-25

## 2024-11-01 NOTE — TELEPHONE ENCOUNTER
Post-Op Assessment Note    CV Status:  Stable  Pain Score: 0    Pain management: adequate       Mental Status:  Somnolent   Hydration Status:  Euvolemic   PONV Controlled:  Controlled   Airway Patency:  Patent  Airway: intubated     Post Op Vitals Reviewed: Yes    No anethesia notable event occurred.    Staff: Anesthesiologist           Last Filed PACU Vitals:  Vitals Value Taken Time   Temp 99.32 °F (37.4 °C) 10/31/24 2105   Pulse 93 10/31/24 2105   BP 82/43 10/31/24 2100   Resp 18 10/31/24 2105   SpO2 93 % 10/31/24 2105   Vitals shown include unfiled device data.    Modified Tomer:  No data recorded       Tried to call, mailbox full sent letter.

## (undated) DEVICE — (D)BNDG ADHESIVE FABRIC 3/4X3 -- DISC BY MFR USE ITEM 357960

## (undated) DEVICE — MEDIA CONTRAST 10ML 200MG/ML 41%

## (undated) DEVICE — (D)NDL SPNE 22GX15CM -- DISC BY MFR W/NO SUB

## (undated) DEVICE — (D)SYR 10ML 1/5ML GRAD NSAF -- PKGING CHANGE USE ITEM 338027

## (undated) DEVICE — TRAY MYEL SFTY +

## (undated) DEVICE — NDL SPNE MANAN 22GX6IN --